# Patient Record
Sex: MALE | Race: WHITE | Employment: UNEMPLOYED | URBAN - METROPOLITAN AREA
[De-identification: names, ages, dates, MRNs, and addresses within clinical notes are randomized per-mention and may not be internally consistent; named-entity substitution may affect disease eponyms.]

---

## 2019-02-06 ENCOUNTER — APPOINTMENT (OUTPATIENT)
Dept: CT IMAGING | Age: 74
End: 2019-02-06

## 2019-02-06 ENCOUNTER — APPOINTMENT (OUTPATIENT)
Dept: GENERAL RADIOLOGY | Age: 74
End: 2019-02-06

## 2019-02-06 ENCOUNTER — HOSPITAL ENCOUNTER (EMERGENCY)
Age: 74
Discharge: ANOTHER HOSPITAL | End: 2019-02-06
Attending: EMERGENCY MEDICINE

## 2019-02-06 VITALS
WEIGHT: 174 LBS | TEMPERATURE: 99.6 F | OXYGEN SATURATION: 92 % | DIASTOLIC BLOOD PRESSURE: 71 MMHG | SYSTOLIC BLOOD PRESSURE: 119 MMHG | HEART RATE: 89 BPM | HEIGHT: 68 IN | BODY MASS INDEX: 26.37 KG/M2 | RESPIRATION RATE: 16 BRPM

## 2019-02-06 DIAGNOSIS — J98.2 PNEUMOMEDIASTINUM (HCC): ICD-10-CM

## 2019-02-06 DIAGNOSIS — K22.3 ESOPHAGEAL PERFORATION: Primary | ICD-10-CM

## 2019-02-06 DIAGNOSIS — L02.11 NECK ABSCESS: ICD-10-CM

## 2019-02-06 DIAGNOSIS — R10.9 FLANK PAIN: ICD-10-CM

## 2019-02-06 LAB
ALBUMIN SERPL-MCNC: 3.2 G/DL (ref 3.5–5.2)
ALP BLD-CCNC: 55 U/L (ref 40–129)
ALT SERPL-CCNC: 24 U/L (ref 0–40)
ANION GAP SERPL CALCULATED.3IONS-SCNC: 14 MMOL/L (ref 7–16)
APTT: 25.8 SEC (ref 24.5–35.1)
AST SERPL-CCNC: 25 U/L (ref 0–39)
BASOPHILS ABSOLUTE: 0.03 E9/L (ref 0–0.2)
BASOPHILS RELATIVE PERCENT: 0.4 % (ref 0–2)
BILIRUB SERPL-MCNC: 1 MG/DL (ref 0–1.2)
BUN BLDV-MCNC: 31 MG/DL (ref 8–23)
BURR CELLS: ABNORMAL
CALCIUM SERPL-MCNC: 9 MG/DL (ref 8.6–10.2)
CHLORIDE BLD-SCNC: 91 MMOL/L (ref 98–107)
CO2: 26 MMOL/L (ref 22–29)
CREAT SERPL-MCNC: 1.4 MG/DL (ref 0.7–1.2)
DOHLE BODIES: ABNORMAL
EOSINOPHILS ABSOLUTE: 0 E9/L (ref 0.05–0.5)
EOSINOPHILS RELATIVE PERCENT: 0 % (ref 0–6)
GFR AFRICAN AMERICAN: >60
GFR NON-AFRICAN AMERICAN: 50 ML/MIN/1.73
GLUCOSE BLD-MCNC: 144 MG/DL (ref 74–99)
HCT VFR BLD CALC: 44.2 % (ref 37–54)
HEMOGLOBIN: 14.3 G/DL (ref 12.5–16.5)
IMMATURE GRANULOCYTES #: 0.04 E9/L
IMMATURE GRANULOCYTES %: 0.5 % (ref 0–5)
INR BLD: 1.1
LACTIC ACID: 2.3 MMOL/L (ref 0.5–2.2)
LIPASE: 12 U/L (ref 13–60)
LYMPHOCYTES ABSOLUTE: 0.37 E9/L (ref 1.5–4)
LYMPHOCYTES RELATIVE PERCENT: 4.3 % (ref 20–42)
MCH RBC QN AUTO: 28.3 PG (ref 26–35)
MCHC RBC AUTO-ENTMCNC: 32.4 % (ref 32–34.5)
MCV RBC AUTO: 87.5 FL (ref 80–99.9)
MONOCYTES ABSOLUTE: 0.59 E9/L (ref 0.1–0.95)
MONOCYTES RELATIVE PERCENT: 6.9 % (ref 2–12)
NEUTROPHILS ABSOLUTE: 7.53 E9/L (ref 1.8–7.3)
NEUTROPHILS RELATIVE PERCENT: 87.9 % (ref 43–80)
PDW BLD-RTO: 13.9 FL (ref 11.5–15)
PLATELET # BLD: 102 E9/L (ref 130–450)
PMV BLD AUTO: 11.5 FL (ref 7–12)
POIKILOCYTES: ABNORMAL
POTASSIUM REFLEX MAGNESIUM: 3.9 MMOL/L (ref 3.5–5)
PROTHROMBIN TIME: 13 SEC (ref 9.3–12.4)
RBC # BLD: 5.05 E12/L (ref 3.8–5.8)
SODIUM BLD-SCNC: 131 MMOL/L (ref 132–146)
TOTAL PROTEIN: 7.3 G/DL (ref 6.4–8.3)
TOXIC GRANULATION: ABNORMAL
TROPONIN: <0.01 NG/ML (ref 0–0.03)
VACUOLATED NEUTROPHILS: ABNORMAL
WBC # BLD: 8.6 E9/L (ref 4.5–11.5)

## 2019-02-06 PROCEDURE — 92511 NASOPHARYNGOSCOPY: CPT | Performed by: OTOLARYNGOLOGY

## 2019-02-06 PROCEDURE — 85610 PROTHROMBIN TIME: CPT

## 2019-02-06 PROCEDURE — 2500000003 HC RX 250 WO HCPCS: Performed by: EMERGENCY MEDICINE

## 2019-02-06 PROCEDURE — 85025 COMPLETE CBC W/AUTO DIFF WBC: CPT

## 2019-02-06 PROCEDURE — 96367 TX/PROPH/DG ADDL SEQ IV INF: CPT

## 2019-02-06 PROCEDURE — 85730 THROMBOPLASTIN TIME PARTIAL: CPT

## 2019-02-06 PROCEDURE — 70491 CT SOFT TISSUE NECK W/DYE: CPT

## 2019-02-06 PROCEDURE — 2580000003 HC RX 258: Performed by: EMERGENCY MEDICINE

## 2019-02-06 PROCEDURE — 71045 X-RAY EXAM CHEST 1 VIEW: CPT

## 2019-02-06 PROCEDURE — 6360000002 HC RX W HCPCS: Performed by: EMERGENCY MEDICINE

## 2019-02-06 PROCEDURE — 99284 EMERGENCY DEPT VISIT MOD MDM: CPT | Performed by: OTOLARYNGOLOGY

## 2019-02-06 PROCEDURE — 80053 COMPREHEN METABOLIC PANEL: CPT

## 2019-02-06 PROCEDURE — 96375 TX/PRO/DX INJ NEW DRUG ADDON: CPT

## 2019-02-06 PROCEDURE — 74174 CTA ABD&PLVS W/CONTRAST: CPT

## 2019-02-06 PROCEDURE — 83605 ASSAY OF LACTIC ACID: CPT

## 2019-02-06 PROCEDURE — 6360000004 HC RX CONTRAST MEDICATION: Performed by: RADIOLOGY

## 2019-02-06 PROCEDURE — 96365 THER/PROPH/DIAG IV INF INIT: CPT

## 2019-02-06 PROCEDURE — 83690 ASSAY OF LIPASE: CPT

## 2019-02-06 PROCEDURE — 84484 ASSAY OF TROPONIN QUANT: CPT

## 2019-02-06 PROCEDURE — 99291 CRITICAL CARE FIRST HOUR: CPT

## 2019-02-06 PROCEDURE — 71275 CT ANGIOGRAPHY CHEST: CPT

## 2019-02-06 PROCEDURE — 2580000003 HC RX 258: Performed by: RADIOLOGY

## 2019-02-06 RX ORDER — SODIUM CHLORIDE 0.9 % (FLUSH) 0.9 %
10 SYRINGE (ML) INJECTION 2 TIMES DAILY
Status: DISCONTINUED | OUTPATIENT
Start: 2019-02-06 | End: 2019-02-06 | Stop reason: HOSPADM

## 2019-02-06 RX ORDER — DOXYCYCLINE HYCLATE 100 MG/1
100 CAPSULE ORAL 2 TIMES DAILY
COMMUNITY
Start: 2019-02-04 | End: 2019-02-14

## 2019-02-06 RX ORDER — FENTANYL CITRATE 50 UG/ML
50 INJECTION, SOLUTION INTRAMUSCULAR; INTRAVENOUS ONCE
Status: COMPLETED | OUTPATIENT
Start: 2019-02-06 | End: 2019-02-06

## 2019-02-06 RX ORDER — CLINDAMYCIN PHOSPHATE 900 MG/50ML
900 INJECTION INTRAVENOUS ONCE
Status: COMPLETED | OUTPATIENT
Start: 2019-02-06 | End: 2019-02-06

## 2019-02-06 RX ORDER — OSELTAMIVIR PHOSPHATE 75 MG/1
75 CAPSULE ORAL 2 TIMES DAILY
COMMUNITY
Start: 2019-02-04 | End: 2019-02-09

## 2019-02-06 RX ORDER — IBUPROFEN 400 MG/1
400 TABLET ORAL EVERY 6 HOURS PRN
Status: ON HOLD | COMMUNITY
End: 2019-06-22 | Stop reason: HOSPADM

## 2019-02-06 RX ORDER — 0.9 % SODIUM CHLORIDE 0.9 %
1000 INTRAVENOUS SOLUTION INTRAVENOUS ONCE
Status: COMPLETED | OUTPATIENT
Start: 2019-02-06 | End: 2019-02-06

## 2019-02-06 RX ADMIN — Medication 10 ML: at 04:37

## 2019-02-06 RX ADMIN — IOPAMIDOL 100 ML: 755 INJECTION, SOLUTION INTRAVENOUS at 04:49

## 2019-02-06 RX ADMIN — SODIUM CHLORIDE 3 G: 900 INJECTION INTRAVENOUS at 05:33

## 2019-02-06 RX ADMIN — CLINDAMYCIN IN 5 PERCENT DEXTROSE 900 MG: 18 INJECTION, SOLUTION INTRAVENOUS at 06:52

## 2019-02-06 RX ADMIN — FENTANYL CITRATE 50 MCG: 50 INJECTION, SOLUTION INTRAMUSCULAR; INTRAVENOUS at 03:51

## 2019-02-06 RX ADMIN — SODIUM CHLORIDE 1000 ML: 9 INJECTION, SOLUTION INTRAVENOUS at 03:42

## 2019-02-06 ASSESSMENT — PAIN DESCRIPTION - PAIN TYPE: TYPE: ACUTE PAIN

## 2019-02-06 ASSESSMENT — ENCOUNTER SYMPTOMS
SORE THROAT: 1
CONSTIPATION: 0
BLOOD IN STOOL: 0
NAUSEA: 0
ABDOMINAL PAIN: 1
VOICE CHANGE: 0
EYE REDNESS: 0
COLOR CHANGE: 0
SHORTNESS OF BREATH: 0
DIARRHEA: 0
ABDOMINAL DISTENTION: 0
COUGH: 0
CHEST TIGHTNESS: 0
ANAL BLEEDING: 0
VOMITING: 0
TROUBLE SWALLOWING: 0
CHOKING: 0
EYE PAIN: 0

## 2019-02-06 ASSESSMENT — PAIN SCALES - GENERAL
PAINLEVEL_OUTOF10: 4
PAINLEVEL_OUTOF10: 3
PAINLEVEL_OUTOF10: 4

## 2019-02-06 ASSESSMENT — PAIN DESCRIPTION - ONSET: ONSET: ON-GOING

## 2019-02-06 ASSESSMENT — PAIN DESCRIPTION - LOCATION: LOCATION: FLANK

## 2019-02-06 ASSESSMENT — PAIN DESCRIPTION - FREQUENCY: FREQUENCY: CONTINUOUS

## 2019-02-06 ASSESSMENT — PAIN DESCRIPTION - PROGRESSION
CLINICAL_PROGRESSION: GRADUALLY WORSENING
CLINICAL_PROGRESSION: GRADUALLY IMPROVING

## 2019-02-06 ASSESSMENT — PAIN DESCRIPTION - ORIENTATION: ORIENTATION: RIGHT

## 2019-02-06 ASSESSMENT — PAIN DESCRIPTION - DESCRIPTORS: DESCRIPTORS: SHARP;CONSTANT

## 2019-02-06 NOTE — ED PROVIDER NOTES
Please note that the withdrawal or failure to initiate urgent interventions for this patient would likely result in a life threatening deterioration or permanent disability. Accordingly this patient received 30 minutes of critical care time, excluding separately billable procedures. crit     Attending; I examined and reexamined the patient on multiple occasions and the patient was complaining of significant neck and posterior pharynx pain. He was tender and had some fullness in the right neck however there was no palpable crepitance. He had multiple areas of white exudate on the tongue and posterior oropharynx. The patient had CT scan of soft tissue neck, CT of chest and CT of abdomen. The patient was noted to have thickness of the esophagus and air in the posterior pharyngeal space probably related to perforated esophagus. ENT and cardiothoracic were consulted. I spoke to Dr. Delroy Trejo, who was on call for cardiothoracic; he stated that he does not operate on or  care of esophageal problems; and I asked if there was anyone a [place] that did ,he said there was not. He [facility]; I then called to the access Center and spoke to Dr. Phil Oliveira who was on-call for [facility] he accepted the patient for transfer to the ER. We attempted to fly the patient presents for however weather prevented this and the patient was in the process of being transported by ground. The patient although having significant CT findings on the neck and chest remained relatively stable was not in any distress did not require intubation at this time there was no stridor or drooling. Vital signs remained stable. Labs were reviewed the patient was given broad-spectrum antibiotics prior to transfer he was seen by the ENT senior resident who discussed the case with his attending.   Again the patient will be transferred by critical care team to the [facility]..     Lisa Rodriguez MD  02/07/19 [id number]

## 2019-02-06 NOTE — ED PROVIDER NOTES
The patient is a 73 y/o male with no significant PMH, who presents for flank pain and phayngitis. Patient developed a sore throat 1 week ago. He was seen by PCP and prescribed doxycycline and tamiflu for the flu. He has been taking both without relief. He continues to have pain with swallowing but getting progressively worse to the point where he has difficult time swallowing secretions or even water. Today he began developing a right lower flank pain which is sharp and severe. Nothing makes it better or worse. He denies previous similar episodes or recent traumas or injuries. No associated nausea vomiting or diarrhea, dysuria or hematuria, testicular pain or swelling. Denies history tobacco use, hypertension, aneurysms. He denies stridor, choking, shortness of breath, trismus. Denies food or pills getting stuck in throat. The history is provided by the patient. Abdominal Pain   Pain location:  R flank  Pain quality: sharp    Pain radiates to:  R flank  Pain severity:  Severe  Onset quality:  Sudden  Timing:  Constant  Progression:  Unchanged  Chronicity:  New  Context: not alcohol use, not awakening from sleep, not diet changes, not eating, not laxative use, not medication withdrawal, not previous surgeries, not suspicious food intake and not trauma    Relieved by:  Nothing  Worsened by:  Nothing  Ineffective treatments:  None tried  Associated symptoms: sore throat    Associated symptoms: no anorexia, no chest pain, no chills, no constipation, no cough, no diarrhea, no dysuria, no fatigue, no fever, no hematuria, no melena, no nausea, no shortness of breath and no vomiting    Risk factors: being elderly    Risk factors: no alcohol abuse, no aspirin use, has not had multiple surgeries, no NSAID use, not obese and no recent hospitalization        Review of Systems   Constitutional: Negative for appetite change, chills, fatigue and fever. HENT: Positive for sore throat.  Negative for congestion, drooling, No respiratory distress. He has no wheezes. He has no rales. Abdominal: Soft. Normal appearance and bowel sounds are normal. He exhibits no distension, no abdominal bruit and no mass. There is tenderness. There is no rebound and no guarding. No hernia. Musculoskeletal: Normal range of motion. He exhibits no edema. Lymphadenopathy:     He has no cervical adenopathy. Neurological: He is alert and oriented to person, place, and time. Skin: Skin is warm and dry. Capillary refill takes less than 2 seconds. No rash noted. He is not diaphoretic. No erythema. No pallor. Nursing note and vitals reviewed. Procedures    MDM    ED Course as of Feb 06 0736   Wed Feb 06, 2019   1280 Patient has very uncomfortable. We'll treat with pain medication. We'll obtain CTs of the neck chest abdomen to evaluate for soft tissue infection, aneurysm or tears of the aorta. [JL]   Noni Garcia, thoracic surgery. He does not do esophageal repairs and recommended contacting [address] [facility] [address]. [JL]   4011 Patient is resting comfortably. I updated him and his wife on the results of the CT scan showing esophageal perforation. I discussed the need to transfer the patient for surgical repair. Empiric antibiotics ordered. [JL]      ED Course User Index  [JL] Jay Villa,      Patient found to have free air in soft tissues of neck with possible abscess. Given esophagitis, concern for esophageal perforation. No inciting event noted by patient. Denies food or medications getting stuck; however, doxycycline pill esophagitis is a possibility. No etiology of abdominal pain which was patient's main CC to begin with. No signs of aneurysm or dissection. He is resting much more comfortably after Iv fentanyl. No respiratory distress. No surgeons in town that can handle esophageal rupture to patient transferred to [place]. Patient was evaluated and scoped by ENT as well. No obvious perf seen.  Patient and wife updated multiple times by myself and other providers. Abx initiated. He remains stable and is agreeable with transfer. --------------------------------------------- PAST HISTORY ---------------------------------------------  Past Medical History:  has a past medical history of DVT of lower extremity (deep venous thrombosis) (HCC) and Influenza. Past Surgical History:  has no past surgical history on file. Social History:  reports that he has never smoked. He has never used smokeless tobacco. He reports that he does not drink alcohol or use drugs. Family History: family history is not on file. The patients home medications have been reviewed. Allergies: Patient has no known allergies.     -------------------------------------------------- RESULTS -------------------------------------------------    Lab  Results for orders placed or performed during the hospital encounter of 02/06/19   CBC Auto Differential   Result Value Ref Range    WBC 8.6 4.5 - 11.5 E9/L    RBC 5.05 3.80 - 5.80 E12/L    Hemoglobin 14.3 12.5 - 16.5 g/dL    Hematocrit 44.2 37.0 - 54.0 %    MCV 87.5 80.0 - 99.9 fL    MCH 28.3 26.0 - 35.0 pg    MCHC 32.4 32.0 - 34.5 %    RDW 13.9 11.5 - 15.0 fL    Platelets 686 (L) 426 - 450 E9/L    MPV 11.5 7.0 - 12.0 fL    Neutrophils % 87.9 (H) 43.0 - 80.0 %    Immature Granulocytes % 0.5 0.0 - 5.0 %    Lymphocytes % 4.3 (L) 20.0 - 42.0 %    Monocytes % 6.9 2.0 - 12.0 %    Eosinophils % 0.0 0.0 - 6.0 %    Basophils % 0.4 0.0 - 2.0 %    Neutrophils # 7.53 (H) 1.80 - 7.30 E9/L    Immature Granulocytes # 0.04 E9/L    Lymphocytes # 0.37 (L) 1.50 - 4.00 E9/L    Monocytes # 0.59 0.10 - 0.95 E9/L    Eosinophils # 0.00 (L) 0.05 - 0.50 E9/L    Basophils # 0.03 0.00 - 0.20 E9/L    Toxic Granulation 1+     Dohle Bodies 1+     Vacuolated Neutrophils 1+     Poikilocytes 1+     [place] Cells 1+    Comprehensive Metabolic Panel w/ Reflex to MG   Result Value Ref Range    Sodium 131 (L) 132 - 146 mmol/L    Potassium reflex Magnesium 3.9 3.5 - 5.0 mmol/L    Chloride 91 (L) 98 - 107 mmol/L    CO2 26 22 - 29 mmol/L    Anion Gap 14 7 - 16 mmol/L    Glucose 144 (H) 74 - 99 mg/dL    BUN 31 (H) 8 - 23 mg/dL    CREATININE 1.4 (H) 0.7 - 1.2 mg/dL    GFR Non-African American 50 >=60 mL/min/1.73    GFR African American >60     Calcium 9.0 8.6 - 10.2 mg/dL    Total Protein 7.3 6.4 - 8.3 g/dL    Alb 3.2 (L) 3.5 - 5.2 g/dL    Total Bilirubin 1.0 0.0 - 1.2 mg/dL    Alkaline Phosphatase 55 40 - 129 U/L    ALT 24 0 - 40 U/L    AST 25 0 - 39 U/L   Lipase   Result Value Ref Range    Lipase 12 (L) 13 - 60 U/L   Troponin   Result Value Ref Range    Troponin <0.01 0.00 - 0.03 ng/mL   Protime-INR   Result Value Ref Range    Protime 13.0 (H) 9.3 - 12.4 sec    INR 1.1    APTT   Result Value Ref Range    aPTT 25.8 24.5 - 35.1 sec   Lactic Acid, Plasma   Result Value Ref Range    Lactic Acid 2.3 (H) 0.5 - 2.2 mmol/L   EKG 12 Lead   Result Value Ref Range    Ventricular Rate 116 BPM    Atrial Rate 116 BPM    P-R Interval 154 ms    QRS Duration 98 ms    Q-T Interval 302 ms    QTc Calculation (Bazett) 419 ms    P Axis 50 degrees    R Axis 8 degrees    T Axis 58 degrees       Radiology  XR CHEST PORTABLE   Final Result   Tortuous ectatic aorta   Cardiomegaly    Airspace disease compatible with atelectasis or pneumonia   at the right and the left lung base                  CT Soft Tissue Neck W Contrast   Final Result   Addendum 1 of 1      Initial report created on 2/6/2019 5:27:29 AM EST   EXAM:  CT Neck With Intravenous Contrast      CLINICAL HISTORY:  72years old, male; Pain; Neck pain;  Additional info:    Neck    pain, dysphagia      TECHNIQUE:  Axial computed tomography images of the neck with intravenous    contrast.  All CT scans at this facility use at least one of these dose    optimization techniques: automated exposure control; mA and/or kV    adjustment    per patient size (includes targeted exams where dose is matched to    clinical    indication); or iterative reconstruction. Coronal and sagittal    reformatted    images were created and reviewed. CONTRAST:  100 mL of [id number] was administered intravenously. COMPARISON:  No relevant prior studies available. FINDINGS:     Oropharynx:  Unremarkable. No significant tonsillar enlargement. No    peritonsillar abscess. Hypopharynx:  Extensive mucosal thickening in the hypopharynx, with    prominent    on the right and posteriorly. Larynx:  Unremarkable. Normal epiglottis. Trachea:  Unremarkable. Retropharyngeal space: There is free air in the retropharyngeal space    consistent with the presence of a pneumomediastinum. Submandibular/parotid glands:  Unremarkable. Glands are normal in size. Thyroid:  Unremarkable. No enlarged or calcified nodules. Bones/joints:  No acute fracture. Soft tissues:  Multiple abscess formations in the right neck from the    level    of the adenoids to hyoid bone. These are predominantly in the right    palatine    tonsil and adjacent soft tissues. The largest abscess measures 2.7 x 1.2    cm in    transverse dimension. Surrounding edema. Vasculature:  No acute findings. Lymph nodes:  Prominent anterior cervical chain lymph nodes on the right       which are likely reactive nodes. Esophagus:  Mucosal thickening in the esophagus as described on chest    CT. Lung apices:  Unremarkable as visualized. Mediastinum:  Pneumomediastinum. IMPRESSION:        1.  Mucosal thickening in the esophagus as described on chest CT. 2.  There is free air in the retropharyngeal space consistent with the    presence    of a pneumomediastinum. 3.  Multiple abscess formations in the right neck from the level of the    adenoids to hyoid bone. These are predominantly in the right palatine    tonsil    and adjacent soft tissues. The largest abscess measures 2.7 x 1.2 cm in    transverse dimension. Surrounding edema.    4.  Extensive mucosal thickening in the hypopharynx, with prominent on the       right and posteriorly. Addendum created by Dr. Jeff Golden MD on 2/6/2019 6:37:03 AM EST      CRITICAL RESULT:      THIS REPORT CONTAINS FINDINGS THAT MAY BE CRITICAL TO PATIENT CARE. The       findings were verbally communicated via telephone conference with Dr. Rea Og    at    6:34 AM EST on 2/6/2019. The findings were acknowledged and understood. This addendum has been electronically signed by Jeff Golden MD.      Final   1. Mucosal thickening in the esophagus as described on chest CT. 2.  There is free air in the retropharyngeal space consistent with the presence    of a pneumomediastinum. 3.  Multiple abscess formations in the right neck from the level of the    adenoids to hyoid bone. These are predominantly in the right palatine tonsil    and adjacent soft tissues. The largest abscess measures 2.7 x 1.2 cm in    transverse dimension. Surrounding edema. 4.  Extensive mucosal thickening in the hypopharynx, with prominent on the    right and posteriorly. This report has been electronically signed by Jeff Golden MD.      CTA PULMONARY W CONTRAST   Final Result   Addendum 1 of 1      Initial report created on 2/6/2019 5:19:09 AM EST   EXAM:  CT Angiography Chest Without And With Intravenous Contrast      CLINICAL HISTORY:  72years old, male; Pain; Chest pain; Additional info:    Neck,    chest pain - R/O dissection      TECHNIQUE:  Axial computed tomographic angiography images of the chest    without    and with intravenous contrast using pulmonary embolism protocol. All CT    scans    at this facility use at least one of these dose optimization techniques:    automated exposure control; mA and/or kV adjustment per patient size    (includes    targeted exams where dose is matched to clinical indication); or iterative       reconstruction. MIP reconstructed images were created and reviewed.       CONTRAST:  100 mL of [id number] was administered intravenously. COMPARISON:  DX XR CHEST PORTABLE 2/6/2019 4:04 AM      FINDINGS:     Pulmonary arteries:  Unremarkable. No pulmonary embolism. Aorta:  No acute findings. No thoracic aortic aneurysm. Lungs:  Unremarkable. No mass. No consolidation. Pleural space: Moderate right and small left pleural effusions. No pneumothorax. Heart:  Unremarkable. No cardiomegaly. No significant pericardial    effusion. No evidence of RV dysfunction. Mediastinum:  Mucosal thickening in the esophagus. Surrounding    inflammation. Pneumomediastinum. Bones/joints:  Flowing osteophyte formation is lower thoracic spine    compatible with DISH. No acute fracture. No dislocation. Soft tissues:  Unremarkable. Lymph nodes:  Unremarkable. No enlarged lymph nodes. IMPRESSION:        1.  Mucosal thickening in the esophagus. Surrounding inflammation. Pneumomediastinum seen adjacent to the upper esophagus. Findings are    concerning    for an esophageal perforation. Gastrografin swallow with contrast CT of    the    chest would be helpful to assess for rupture if indicated. 2.  No pulmonary embolism. 3.  Moderate right and small left pleural effusions. Addendum created by Dr. Costa Mo MD on 2/6/2019 6:36:57 AM EST      CRITICAL RESULT:      THIS REPORT CONTAINS FINDINGS THAT MAY BE CRITICAL TO PATIENT CARE. The       findings were verbally communicated via telephone conference with Dr. Oliver Yang    at    6:34 AM EST on 2/6/2019. The findings were acknowledged and understood. This addendum has been electronically signed by Costa Mo MD.      Final   1. Mucosal thickening in the esophagus. Surrounding inflammation. Pneumomediastinum seen adjacent to the upper esophagus. Findings are concerning    for an esophageal perforation.  Gastrografin swallow with contrast CT of the    chest would be helpful to assess for rupture if indicated. 2.  No pulmonary embolism. 3.  Moderate right and small left pleural effusions. This report has been electronically signed by Forrest Christine MD.      [address] [place] [address]   Final Result   1. No abdominal aortic aneurysm or dissection. Mild atherosclerotic disease. 2.  Mucosal thickening distal esophagus. 3.  Fluid in the colon consistent with diarrhea. This report has been electronically signed by Forrest Christine MD.          EKG: This EKG is signed and interpreted by me. Rate: 116  Rhythm: Sinus  Interpretation: no acute changes, non-specific EKG and Baseline artifact  Comparison: no previous EKG available      ------------------------- NURSING NOTES AND VITALS REVIEWED ---------------------------  Date / Time Roomed:  2/6/2019  2:53 AM  ED Bed Assignment:  26/26    The nursing notes within the ED encounter and vital signs as below have been reviewed. Patient Vitals for the past 24 hrs:   BP Temp Temp src Pulse Resp SpO2 Height Weight   02/06/19 0656 119/71 99.6 °F (37.6 °C) Oral 89 16 92 % - -   02/06/19 0630 (!) 110/55 - - 92 - 91 % - -   02/06/19 0530 (!) 117/55 99.7 °F (37.6 °C) Oral 94 16 92 % - -   02/06/19 0400 (!) 145/75 - - 98 - 92 % - -   02/06/19 0259 (!) 148/75 99.2 °F (37.3 °C) Oral 101 20 93 % 5' 8\" (1.727 m) 174 lb (78.9 kg)       Oxygen Saturation Interpretation: Normal      ------------------------------------------ PROGRESS NOTES ------------------------------------------  Re-evaluation(s):  Time: 9713. Patients symptoms are improving  Repeat physical examination is improved    I have spoken with the patient and wife and discussed todays results, in addition to providing specific details for the plan of care and counseling regarding the diagnosis and prognosis. Their questions are answered at this time and they are agreeable with the plan.   I have discussed the risks and benefits of transfer and they wish to proceed with the transfer. --------------------------------- ADDITIONAL PROVIDER NOTES ---------------------------------  Consultations:  Spoke with Dr. Parish Michelle surgery at [facility] (cardiothoracic surgery). Discussed case. They will provide consultation and accept the patient for transfer. Reason for transfer: Higher level of care. This patient's ED course included: a personal history and physicial examination, re-evaluation prior to disposition, multiple bedside re-evaluations, IV medications, cardiac monitoring, continuous pulse oximetry and complex medical decision making and emergency management    This patient has remained hemodynamically stable and been closely monitored during their ED course. Please note that the withdrawal or failure to initiate urgent interventions for this patient would likely result in a life threatening deterioration or permanent disability. Accordingly this patient received 60 minutes of critical care time, excluding separately billable procedures. Clinical Impression  1. Esophageal perforation    2. Flank pain    3. Neck abscess    4. Pneumomediastinum ([address]          Disposition  Patient's disposition: Transfer to [facility]. Transferred by: mobile ICU. Patient's condition is serious.               Wilian Mccabe DO  Resident  02/06/19 2438

## 2019-02-06 NOTE — CONSULTS
Otolaryngology  Consult Note    Patient's Name/Date of Birth: Myrna Gomes / 1945 (87 y.o.)    Date: February 6, 2019     Chief Complaint: Sore throat    HPI: 71 y/o M with no prior medical history presents with R flank pain and sore throat for 5 days. He was recently treated with tamiflu and doxycycline by his primary care physician. He denies prior dysphagia, dysphonia, chest pain, hemoptysis, weight change, or emesis. He has not smoked or drank in over 40 years. He denies any sick contacts. No history of HIV. He works in an [employer]. Denies any trauma or globus sensation. He is currently resting in bed in no acute distress and his vital signs are stable. He does have a muffled voice. Past Medical History:   Diagnosis Date    DVT of lower extremity (deep venous thrombosis) ([facility] [address]     left    Influenza 02/2019       History reviewed. No pertinent surgical history. Prior to Admission medications    Medication Sig Start Date End Date Taking? Authorizing Provider   oseltamivir (TAMIFLU) 75 MG capsule Take 75 mg by mouth 2 times daily 2/4/19 2/9/19 Yes Historical Provider, MD   doxycycline hyclate (VIBRAMYCIN) 100 MG capsule Take 100 mg by mouth 2 times daily 2/4/19 2/14/19 Yes Historical Provider, MD   ibuprofen (ADVIL;MOTRIN) 400 MG tablet Take 400 mg by mouth every 6 hours as needed for Pain   Yes Historical Provider, MD       No Known Allergies    History reviewed. No pertinent family history. Social History     Social History    Marital status:      Spouse name: N/A    Number of children: N/A    Years of education: N/A     Occupational History    Not on file.      Social History Main Topics    Smoking status: Never Smoker    Smokeless tobacco: Never Used    Alcohol use No    Drug use: No    Sexual activity: Not on file     Other Topics Concern    Not on file     Social History Narrative    No narrative on file       Review of Systems:   CONSTITUTIONAL:  negative for fevers, chills, fatigue and weight loss   EYES:  negative for  blurred vision, eye discharge and visual disturbance   HEENT:  negative for  hearing loss, tinnitus, ear drainage, earaches, nasal congestion, epistaxis, snoring, hoarseness positive for sore throat and voice change  RESPIRATORY:  negative for  dry cough, cough with sputum, dyspnea and wheezing   CARDIOVASCULAR:  negative for  chest pain, dyspnea   GASTROINTESTINAL:  negative for dysphagia and reflux   INTEGUMENT/BREAST:  negative for rash and skin lesion(s)   HEMATOLOGIC/LYMPHATIC:  negative for easy bruising, bleeding and lymphadenopathy   ALLERGIC/IMMUNOLOGIC:  negative for recurrent infections, urticaria and angioedema   ENDOCRINE:  negative for heat intolerance, cold intolerance and weight changes   MUSCULOSKELETAL:  negative for  myalgias and arthralgias     Physical Exam:  Vitals:    02/06/19 0400 02/06/19 0530 02/06/19 0630 02/06/19 0656   BP: (!) 145/75 (!) 117/55 (!) 110/55 119/71   Pulse: 98 94 92 89   Resp:  16  16   Temp:  99.7 °F (37.6 °C)  99.6 °F (37.6 °C)   TempSrc:  Oral  Oral   SpO2: 92% 92% 91% 92%   Weight:       Height:           CONSTITUTIONAL:  awake, alert, cooperative, no apparent distress, and appears stated age   EYES:  Lids and lashes normal, pupils equal, round and reactive to light, extra ocular muscles intact, sclera clear  EARS: external ears without lesions, no drainage  NOSE: Nares normal. Septum midline. Mucosa normal. No drainage or sinus tenderness. , no sinus tenderness  MOUTH: upper and lower dentures, diffuse white patches to posterior soft palate and anterior tonsilar fossae, no palpable fluctuance, no uvular deviation, tonsils are +1 bilaterally  THROAT: muffled voice,   NECK:  Tender lymphadenopathy to R level II  HEMATOLOGIC/LYMPHATICS:  cervical lymphadenopathy noted  LUNGS:  no increased work of breathing and good air exchange  CARDIOVASCULAR:  Normal rate   ABDOMEN: Soft, non-distended  SKIN: Warm, no rashes or edema    LABS:  CBC  Recent Labs      02/06/19 0343   WBC  8.6   HGB  14.3   HCT  44.2   PLT  102*     BMP  Recent Labs      02/06/19 0343   NA  131*   K  3.9   CL  91*   CO2  26   BUN  31*   CREATININE  1.4*   CALCIUM  9.0     Liver Function  Recent Labs      02/06/19 0343   LIPASE  12*   BILITOT  1.0   AST  25   ALT  24   ALKPHOS  55   PROT  7.3   LABALBU  3.2*     No results for input(s): LACTATE in the last 72 hours. Recent Labs      02/06/19 0343   INR  1.1       RADIOLOGY    Ct Soft Tissue Neck W Contrast    Addendum Date: 2/6/2019    Initial report created on 2/6/2019 5:27:29 AM EST EXAM:  CT Neck With Intravenous Contrast CLINICAL HISTORY:  72years old, male; Pain; Neck pain; Additional info: Neck pain, dysphagia TECHNIQUE:  Axial computed tomography images of the neck with intravenous contrast.  All CT scans at this facility use at least one of these dose optimization techniques: automated exposure control; mA and/or kV adjustment per patient size (includes targeted exams where dose is matched to clinical indication); or iterative reconstruction. Coronal and sagittal reformatted images were created and reviewed. CONTRAST:  100 mL of [id number] was administered intravenously. COMPARISON:  No relevant prior studies available. FINDINGS:   Oropharynx:  Unremarkable. No significant tonsillar enlargement. No peritonsillar abscess. Hypopharynx:  Extensive mucosal thickening in the hypopharynx, with prominent on the right and posteriorly. Larynx:  Unremarkable. Normal epiglottis. Trachea:  Unremarkable. Retropharyngeal space: There is free air in the retropharyngeal space consistent with the presence of a pneumomediastinum. Submandibular/parotid glands:  Unremarkable. Glands are normal in size. Thyroid:  Unremarkable. No enlarged or calcified nodules. Bones/joints:  No acute fracture.    Soft tissues:  Multiple abscess formations in the right neck from the level of the adenoids to hyoid bone. These are predominantly in the right palatine tonsil and adjacent soft tissues. The largest abscess measures 2.7 x 1.2 cm in transverse dimension. Surrounding edema. Vasculature:  No acute findings. Lymph nodes:  Prominent anterior cervical chain lymph nodes on the right which are likely reactive nodes. Esophagus:  Mucosal thickening in the esophagus as described on chest CT. Lung apices:  Unremarkable as visualized. Mediastinum:  Pneumomediastinum. IMPRESSION:     1.  Mucosal thickening in the esophagus as described on chest CT. 2.  There is free air in the retropharyngeal space consistent with the presence of a pneumomediastinum. 3.  Multiple abscess formations in the right neck from the level of the adenoids to hyoid bone. These are predominantly in the right palatine tonsil and adjacent soft tissues. The largest abscess measures 2.7 x 1.2 cm in transverse dimension. Surrounding edema. 4.  Extensive mucosal thickening in the hypopharynx, with prominent on the right and posteriorly. Addendum created by Dr. Lisa Simental MD on 2/6/2019 6:37:03 AM EST CRITICAL RESULT:   THIS REPORT CONTAINS FINDINGS THAT MAY BE CRITICAL TO PATIENT CARE. The findings were verbally communicated via telephone conference with Dr. Earnestine Montague at 6:34 AM EST on 2/6/2019. The findings were acknowledged and understood. This addendum has been electronically signed by Lisa Simental MD.    Result Date: 2/6/2019  Initial report created on 2/6/2019 5:27:29 AM EST EXAM:  CT Neck With Intravenous Contrast CLINICAL HISTORY:  72years old, male; Pain; Neck pain; Additional info: Neck pain, dysphagia TECHNIQUE:  Axial computed tomography images of the neck with intravenous contrast.  All CT scans at this facility use at least one of these dose optimization techniques: automated exposure control; mA and/or kV adjustment per patient size (includes targeted exams where dose is matched to clinical indication); or iterative reconstruction. Coronal and sagittal reformatted images were created and reviewed. CONTRAST:  100 mL of [id number] was administered intravenously. COMPARISON:  No relevant prior studies available. FINDINGS:   Oropharynx:  Unremarkable. No significant tonsillar enlargement. No peritonsillar abscess. Hypopharynx:  Extensive mucosal thickening in the hypopharynx, with prominent on the right and posteriorly. Larynx:  Unremarkable. Normal epiglottis. Trachea:  Unremarkable. Retropharyngeal space: There is free air in the retropharyngeal space consistent with the presence of a pneumomediastinum. Submandibular/parotid glands:  Unremarkable. Glands are normal in size. Thyroid:  Unremarkable. No enlarged or calcified nodules. Bones/joints:  No acute fracture. Soft tissues:  Multiple abscess formations in the right neck from the level of the adenoids to hyoid bone. These are predominantly in the right palatine tonsil and adjacent soft tissues. The largest abscess measures 2.7 x 1.2 cm in transverse dimension. Surrounding edema. Vasculature:  No acute findings. Lymph nodes:  Prominent anterior cervical chain lymph nodes on the right which are likely reactive nodes. Esophagus:  Mucosal thickening in the esophagus as described on chest CT. Lung apices:  Unremarkable as visualized. Mediastinum:  Pneumomediastinum. 1.  Mucosal thickening in the esophagus as described on chest CT. 2.  There is free air in the retropharyngeal space consistent with the presence of a pneumomediastinum. 3.  Multiple abscess formations in the right neck from the level of the adenoids to hyoid bone. These are predominantly in the right palatine tonsil and adjacent soft tissues. The largest abscess measures 2.7 x 1.2 cm in transverse dimension. Surrounding edema. 4.  Extensive mucosal thickening in the hypopharynx, with prominent on the right and posteriorly.  This report has been electronically signed by Jeff Golden MD.    Xr Chest Portable    Result Date: 2019  Patient MRN: [id number] : 1945 Age:  72 years Gender: Male Order Date: 2019 3:30 AM Exam: XR CHEST PORTABLE Number of Images: 1 view Indication:   chest pain chest pain Comparison: None. Findings: The heart is enlarged. The lung fields demonstrate evidence for airspace disease. The aorta is tortuous and ectatic. Tortuous ectatic aorta Cardiomegaly Airspace disease compatible with atelectasis or pneumonia at the right and the left lung base     Cta Abdomen Pelvis W Contrast    Result Date: 2019  Initial report created on 2019 5:31:29 AM EST EXAM:  CT Angiography Abdomen and Pelvis Without And With Intravenous Contrast CLINICAL HISTORY:  72years old, male; Pain; Abdominal pain; Other: Right sided; Additional info: R/O dissection TECHNIQUE:  Axial computed tomographic angiography images of the abdomen and pelvis without and with intravenous contrast.  All CT scans at this facility use at least one of these dose optimization techniques: automated exposure control; mA and/or kV adjustment per patient size (includes targeted exams where dose is matched to clinical indication); or iterative reconstruction. MIP reconstructed images were created and reviewed. Coronal reformatted images were created and reviewed. CONTRAST:  100 mL of [id number] was administered intravenously. COMPARISON:  No relevant prior studies available. FINDINGS:  VASCULATURE:   Aorta:  No acute findings. No abdominal aortic aneurysm or dissection. Mild atherosclerotic disease. Celiac trunk and mesenteric arteries:  No acute findings. No occlusion or significant stenosis. Renal arteries:  No acute findings. No occlusion or significant stenosis. Iliac arteries:  No acute findings. No occlusion or significant stenosis. Lung bases:  See the chest CT for acute findings in the lungs and lower chest.   Mediastinum:  Mucosal thickening distal esophagus. ABDOMEN:   Liver:  Unremarkable. No mass. Gallbladder and bile ducts:  Small stone within gallbladder. Gallbladder otherwise normal.       No ductal dilation. Pancreas:  Unremarkable. No ductal dilation. No mass. Spleen:  Unremarkable. No splenomegaly. Adrenals:  Unremarkable. No mass. Kidneys and ureters:  Unremarkable. No obstructing stones. No hydronephrosis. No solid mass. Stomach and bowel:  Fluid in the colon consistent with diarrhea. No obstruction. No mucosal thickening. PELVIS:   Appendix:  Normal appendix. Bladder:  Unremarkable. No stones. No mass. Reproductive:  Unremarkable as visualized. ABDOMEN and PELVIS:   Intraperitoneal space:  Unremarkable. No significant fluid collection. No free air. Bones/joints:  No acute fracture. No dislocation. Soft tissues:  Bilateral inguinal hernias containing fat. Lymph nodes:  Unremarkable. No enlarged lymph nodes. 1.  No abdominal aortic aneurysm or dissection. Mild atherosclerotic disease. 2.  Mucosal thickening distal esophagus. 3.  Fluid in the colon consistent with diarrhea. This report has been electronically signed by Jaida Moya MD.    Lilly [facility] Pulmonary W Contrast    Addendum Date: 2/6/2019    Initial report created on 2/6/2019 5:19:09 AM EST EXAM:  CT Angiography Chest Without And With Intravenous Contrast CLINICAL HISTORY:  72years old, male; Pain; Chest pain; Additional info: Neck, chest pain - R/O dissection TECHNIQUE:  Axial computed tomographic angiography images of the chest without and with intravenous contrast using pulmonary embolism protocol. All CT scans at this facility use at least one of these dose optimization techniques: automated exposure control; mA and/or kV adjustment per patient size (includes targeted exams where dose is matched to clinical indication); or iterative reconstruction. MIP reconstructed images were created and reviewed. CONTRAST:  100 mL of [id number] was administered intravenously.   COMPARISON:  DX XR CHEST dose optimization techniques: automated exposure control; mA and/or kV adjustment per patient size (includes targeted exams where dose is matched to clinical indication); or iterative reconstruction. MIP reconstructed images were created and reviewed. CONTRAST:  100 mL of [id number] was administered intravenously. COMPARISON:  DX XR CHEST PORTABLE 2/6/2019 4:04 AM FINDINGS:   Pulmonary arteries:  Unremarkable. No pulmonary embolism. Aorta:  No acute findings. No thoracic aortic aneurysm. Lungs:  Unremarkable. No mass. No consolidation. Pleural space: Moderate right and small left pleural effusions. No pneumothorax. Heart:  Unremarkable. No cardiomegaly. No significant pericardial effusion. No evidence of RV dysfunction. Mediastinum:  Mucosal thickening in the esophagus. Surrounding inflammation. Pneumomediastinum. Bones/joints:  Flowing osteophyte formation is lower thoracic spine compatible with DISH. No acute fracture. No dislocation. Soft tissues:  Unremarkable. Lymph nodes:  Unremarkable. No enlarged lymph nodes. 1.  Mucosal thickening in the esophagus. Surrounding inflammation. Pneumomediastinum seen adjacent to the upper esophagus. Findings are concerning for an esophageal perforation. Gastrografin swallow with contrast CT of the chest would be helpful to assess for rupture if indicated. 2.  No pulmonary embolism. 3.  Moderate right and small left pleural effusions. This report has been electronically signed by Shoshana Mei MD.    Endoscopy Procedure Note    Pre-operative Diagnosis: peritonsillar and retropharyngeal abscess    Post-operative Diagnosis: same    Indications: Acute airway obstruction evaluation    Anesthesia: Lidocaine 4% and Suhail-Synephrine 1/2%    Endoscopy Type:  nasal endoscopy, laryngoscopy    Procedure Details   With the patient sitting upright in the examining chair informed consent was obtained.   The bilateral side(s) of the nose were topically anesthetized with spray. After waiting an appropriate period of time for anesthesia/ vasoconstriction to become effective, the flexible fiberoptic  flexible laryngoscope was passed through the right side(s) of the nose, and the nose, nasopharynx, oropharynx, hypopharynx and larynx were examined. An identical procedure was performed on the contralateral side. Examination was performed during quiet respiration and with phonation. I was present for the entire procedure. The following findings were noted.     Findings:  Mucosa:  normal   Nasal septum:  deviated   Discharge:  none   Turbinates:  normal   Adenoid:  normal   Posterior choanae:  normal   Eustachian tubes:  normal   Mucous stranding:  absent   Lesions:  absent   Modified Clark's Maneuver not indicated       Condition:  Stable    Complications:  None  Findings: fullness to R parapharyngeal region, no masses, lesions, or ulceration, no herpetic lesion, no obvious mucosal disruption, epiglottis is sharp, bilateral vocal cords are symmetric and mobile, airway is widely patent, pyriform sinuses are without abnormalities    Assessment/Plan:  1. 71 y/o M with parapharyngeal and retropharyngeal abscess with possible esophageal rupture, pneumomediastinum    - recommend emergent transfer to tertiary care facility for possible esophageal intervention  - high does antibiotics and critical care management  - discussed emergent nature of this case with the emergency room physician and this was discussed with ENT attending physician  - transfer is being arranged  - recommend appropriate IV access and cardiopulmonary monitoring, consider elective intubation  - the patient on exam does not appear to be in any distress but I would have a low threshold for intubation    Patient seen, examined, and plan discussed with Dr. Tisha Arita    Electronically signed by Efe Mo DO on 2/6/2019 at 8:37 AM

## 2019-02-21 LAB
EKG ATRIAL RATE: 116 BPM
EKG P AXIS: 50 DEGREES
EKG P-R INTERVAL: 154 MS
EKG Q-T INTERVAL: 302 MS
EKG QRS DURATION: 98 MS
EKG QTC CALCULATION (BAZETT): 419 MS
EKG R AXIS: 8 DEGREES
EKG T AXIS: 58 DEGREES
EKG VENTRICULAR RATE: 116 BPM

## 2019-03-07 ENCOUNTER — HOSPITAL ENCOUNTER (OUTPATIENT)
Dept: CT IMAGING | Age: 74
Discharge: HOME | End: 2019-03-09

## 2019-03-07 DIAGNOSIS — J39.1: ICD-10-CM

## 2019-03-07 PROCEDURE — 2580000003 HC RX 258: Performed by: RADIOLOGY

## 2019-03-07 PROCEDURE — 70491 CT SOFT TISSUE NECK W/DYE: CPT

## 2019-03-07 PROCEDURE — 6360000004 HC RX CONTRAST MEDICATION: Performed by: RADIOLOGY

## 2019-03-07 PROCEDURE — 71260 CT THORAX DX C+: CPT

## 2019-03-07 RX ORDER — SODIUM CHLORIDE 0.9 % (FLUSH) 0.9 %
10 SYRINGE (ML) INJECTION ONCE
Status: COMPLETED | OUTPATIENT
Start: 2019-03-07 | End: 2019-03-07

## 2019-03-07 RX ADMIN — IOPAMIDOL 110 ML: 755 INJECTION, SOLUTION INTRAVENOUS at 13:11

## 2019-03-07 RX ADMIN — Medication 10 ML: at 13:12

## 2019-03-13 ENCOUNTER — HOSPITAL ENCOUNTER (OUTPATIENT)
Dept: GENERAL RADIOLOGY | Age: 74
Discharge: HOME | End: 2019-03-15

## 2019-03-13 DIAGNOSIS — J39.1: ICD-10-CM

## 2019-03-13 PROCEDURE — 2500000003 HC RX 250 WO HCPCS: Performed by: RADIOLOGY

## 2019-03-13 PROCEDURE — 92611 MOTION FLUOROSCOPY/SWALLOW: CPT | Performed by: SPEECH-LANGUAGE PATHOLOGIST

## 2019-03-13 PROCEDURE — 74230 X-RAY XM SWLNG FUNCJ C+: CPT

## 2019-03-13 RX ADMIN — BARIUM SULFATE 88 G: 960 POWDER, FOR SUSPENSION ORAL at 10:40

## 2019-03-13 RX ADMIN — BARIUM SULFATE 45 G: 0.6 CREAM ORAL at 10:39

## 2019-06-05 ENCOUNTER — HOSPITAL ENCOUNTER (INPATIENT)
Age: 74
LOS: 17 days | Discharge: HOME | DRG: 885 | End: 2019-06-22
Attending: EMERGENCY MEDICINE | Admitting: PSYCHIATRY & NEUROLOGY
Payer: MEDICARE

## 2019-06-05 DIAGNOSIS — F03.918 PSYCHOSIS IN ELDERLY WITH BEHAVIORAL DISTURBANCE: Primary | ICD-10-CM

## 2019-06-05 PROBLEM — F29 PSYCHOSIS (HCC): Status: ACTIVE | Noted: 2019-06-05

## 2019-06-05 LAB
ACETAMINOPHEN LEVEL: <5 MCG/ML (ref 10–30)
ALBUMIN SERPL-MCNC: 4 G/DL (ref 3.5–5.2)
ALP BLD-CCNC: 61 U/L (ref 40–129)
ALT SERPL-CCNC: 31 U/L (ref 0–40)
AMPHETAMINE SCREEN, URINE: NOT DETECTED
ANION GAP SERPL CALCULATED.3IONS-SCNC: 8 MMOL/L (ref 7–16)
AST SERPL-CCNC: 25 U/L (ref 0–39)
BACTERIA: ABNORMAL /HPF
BARBITURATE SCREEN URINE: NOT DETECTED
BASOPHILS ABSOLUTE: 0.02 E9/L (ref 0–0.2)
BASOPHILS RELATIVE PERCENT: 0.2 % (ref 0–2)
BENZODIAZEPINE SCREEN, URINE: NOT DETECTED
BILIRUB SERPL-MCNC: 0.4 MG/DL (ref 0–1.2)
BILIRUBIN URINE: NEGATIVE
BLOOD, URINE: ABNORMAL
BUN BLDV-MCNC: 15 MG/DL (ref 8–23)
CALCIUM SERPL-MCNC: 9.6 MG/DL (ref 8.6–10.2)
CANNABINOID SCREEN URINE: NOT DETECTED
CHLORIDE BLD-SCNC: 99 MMOL/L (ref 98–107)
CLARITY: CLEAR
CO2: 28 MMOL/L (ref 22–29)
COCAINE METABOLITE SCREEN URINE: NOT DETECTED
COLOR: YELLOW
CREAT SERPL-MCNC: 0.8 MG/DL (ref 0.7–1.2)
EKG ATRIAL RATE: 108 BPM
EKG P AXIS: 71 DEGREES
EKG P-R INTERVAL: 166 MS
EKG Q-T INTERVAL: 344 MS
EKG QRS DURATION: 92 MS
EKG QTC CALCULATION (BAZETT): 460 MS
EKG R AXIS: 35 DEGREES
EKG T AXIS: 75 DEGREES
EKG VENTRICULAR RATE: 108 BPM
EOSINOPHILS ABSOLUTE: 0.02 E9/L (ref 0.05–0.5)
EOSINOPHILS RELATIVE PERCENT: 0.2 % (ref 0–6)
ETHANOL: <10 MG/DL (ref 0–0.08)
GFR AFRICAN AMERICAN: >60
GFR NON-AFRICAN AMERICAN: >60 ML/MIN/1.73
GLUCOSE BLD-MCNC: 105 MG/DL (ref 74–99)
GLUCOSE URINE: NEGATIVE MG/DL
HCT VFR BLD CALC: 42.8 % (ref 37–54)
HEMOGLOBIN: 14.1 G/DL (ref 12.5–16.5)
IMMATURE GRANULOCYTES #: 0.05 E9/L
IMMATURE GRANULOCYTES %: 0.6 % (ref 0–5)
KETONES, URINE: NEGATIVE MG/DL
LEUKOCYTE ESTERASE, URINE: ABNORMAL
LYMPHOCYTES ABSOLUTE: 2.17 E9/L (ref 1.5–4)
LYMPHOCYTES RELATIVE PERCENT: 24.6 % (ref 20–42)
MCH RBC QN AUTO: 29.1 PG (ref 26–35)
MCHC RBC AUTO-ENTMCNC: 32.9 % (ref 32–34.5)
MCV RBC AUTO: 88.2 FL (ref 80–99.9)
METHADONE SCREEN, URINE: NOT DETECTED
MONOCYTES ABSOLUTE: 0.94 E9/L (ref 0.1–0.95)
MONOCYTES RELATIVE PERCENT: 10.7 % (ref 2–12)
NEUTROPHILS ABSOLUTE: 5.62 E9/L (ref 1.8–7.3)
NEUTROPHILS RELATIVE PERCENT: 63.7 % (ref 43–80)
NITRITE, URINE: NEGATIVE
OPIATE SCREEN URINE: NOT DETECTED
PDW BLD-RTO: 13.3 FL (ref 11.5–15)
PH UA: 5 (ref 5–9)
PHENCYCLIDINE SCREEN URINE: NOT DETECTED
PLATELET # BLD: 211 E9/L (ref 130–450)
PMV BLD AUTO: 10.7 FL (ref 7–12)
POTASSIUM SERPL-SCNC: 4.3 MMOL/L (ref 3.5–5)
PROPOXYPHENE SCREEN: NOT DETECTED
PROTEIN UA: NEGATIVE MG/DL
RBC # BLD: 4.85 E12/L (ref 3.8–5.8)
RBC UA: ABNORMAL /HPF (ref 0–2)
SALICYLATE, SERUM: <0.3 MG/DL (ref 0–30)
SODIUM BLD-SCNC: 135 MMOL/L (ref 132–146)
SPECIFIC GRAVITY UA: >=1.03 (ref 1–1.03)
T4 TOTAL: 6.9 MCG/DL (ref 4.5–11.7)
TOTAL PROTEIN: 7.3 G/DL (ref 6.4–8.3)
TRICYCLIC ANTIDEPRESSANTS SCREEN SERUM: NEGATIVE NG/ML
TSH SERPL DL<=0.05 MIU/L-ACNC: 4.02 UIU/ML (ref 0.27–4.2)
UROBILINOGEN, URINE: 0.2 E.U./DL
WBC # BLD: 8.8 E9/L (ref 4.5–11.5)
WBC UA: ABNORMAL /HPF (ref 0–5)

## 2019-06-05 PROCEDURE — 80307 DRUG TEST PRSMV CHEM ANLYZR: CPT

## 2019-06-05 PROCEDURE — 99285 EMERGENCY DEPT VISIT HI MDM: CPT

## 2019-06-05 PROCEDURE — 85025 COMPLETE CBC W/AUTO DIFF WBC: CPT

## 2019-06-05 PROCEDURE — 36415 COLL VENOUS BLD VENIPUNCTURE: CPT

## 2019-06-05 PROCEDURE — 1240000000 HC EMOTIONAL WELLNESS R&B

## 2019-06-05 PROCEDURE — 80053 COMPREHEN METABOLIC PANEL: CPT

## 2019-06-05 PROCEDURE — 93010 ELECTROCARDIOGRAM REPORT: CPT | Performed by: INTERNAL MEDICINE

## 2019-06-05 PROCEDURE — 81001 URINALYSIS AUTO W/SCOPE: CPT

## 2019-06-05 PROCEDURE — 84436 ASSAY OF TOTAL THYROXINE: CPT

## 2019-06-05 PROCEDURE — 93005 ELECTROCARDIOGRAM TRACING: CPT | Performed by: EMERGENCY MEDICINE

## 2019-06-05 PROCEDURE — G0480 DRUG TEST DEF 1-7 CLASSES: HCPCS

## 2019-06-05 PROCEDURE — 84443 ASSAY THYROID STIM HORMONE: CPT

## 2019-06-05 RX ORDER — HALOPERIDOL 5 MG/ML
2 INJECTION INTRAMUSCULAR EVERY 4 HOURS PRN
Status: DISCONTINUED | OUTPATIENT
Start: 2019-06-05 | End: 2019-06-22 | Stop reason: HOSPADM

## 2019-06-05 RX ORDER — LORAZEPAM 0.5 MG/1
0.5 TABLET ORAL EVERY 8 HOURS PRN
Status: DISCONTINUED | OUTPATIENT
Start: 2019-06-05 | End: 2019-06-22 | Stop reason: HOSPADM

## 2019-06-05 RX ORDER — ACETAMINOPHEN 325 MG/1
650 TABLET ORAL EVERY 4 HOURS PRN
Status: DISCONTINUED | OUTPATIENT
Start: 2019-06-05 | End: 2019-06-22 | Stop reason: HOSPADM

## 2019-06-05 RX ORDER — BENZTROPINE MESYLATE 1 MG/ML
2 INJECTION INTRAMUSCULAR; INTRAVENOUS 2 TIMES DAILY PRN
Status: DISCONTINUED | OUTPATIENT
Start: 2019-06-05 | End: 2019-06-22 | Stop reason: HOSPADM

## 2019-06-05 RX ORDER — MAGNESIUM HYDROXIDE/ALUMINUM HYDROXICE/SIMETHICONE 120; 1200; 1200 MG/30ML; MG/30ML; MG/30ML
30 SUSPENSION ORAL PRN
Status: DISCONTINUED | OUTPATIENT
Start: 2019-06-05 | End: 2019-06-22 | Stop reason: HOSPADM

## 2019-06-05 ASSESSMENT — SLEEP AND FATIGUE QUESTIONNAIRES
AVERAGE NUMBER OF SLEEP HOURS: 4
DO YOU HAVE DIFFICULTY SLEEPING: NO
DO YOU USE A SLEEP AID: NO

## 2019-06-05 ASSESSMENT — LIFESTYLE VARIABLES: HISTORY_ALCOHOL_USE: NO

## 2019-06-05 ASSESSMENT — PATIENT HEALTH QUESTIONNAIRE - PHQ9: SUM OF ALL RESPONSES TO PHQ QUESTIONS 1-9: 15

## 2019-06-05 NOTE — ED NOTES
pts wife on site states pt did not get his zoloft this am pt cont with anuj, pt becomes paranoid at times while lab is being obtained.       Aurelia Melendrez RN  06/05/19 1157

## 2019-06-05 NOTE — ED NOTES
Completed patient assessment. Patient unable to verbalize SI/HI. Patient is a 72year old, male presenting to ED for psychiatric evaluation. Patient thought process has poverty of content & patient unable to appropriately answer most assessment questions. Patient reports his medications are making him sick. Per patient spouse, Brandon Tenorio [phone], patient recently started on Zoloft 1 week ago at [facility]. During patient assessment patient is anxious, increased psychomotor agitation, pacing, delusional, paranoid, and rambling speech. Patient reports that earlier today it was raining acid and that he did not want to go outside due to fear that the birds would eat him. Patient is religiously preoccupied and reports that God wants him to have a hernia. Per patient spouse, patient has been increasing in bizarre behaviors & possible auditory/visual hallucinations. Patient spouse reports that patient recently experienced an infection in the throat that had to be removed. She reports patient has lost 20+ lbs in 1 month and that his hernia has increased in size. Patient has a mental health hx of psychosis in 2011, recently began treatment with [facility], and pt last psychiatric admission was in 2011. Patient reports varying sleep, recently appetite has increased since starting Zoloft, and shawn hopelessness/helplessness. SHAWN hx of suicide attempts or self injurious behaviors. Pt denies drug/alcohol use. Patient cooperative, oriented x 2, anxious mood, labile affect, patient irritable at times, disorganized thought process, rambling speech. Patient does not appear to be having A/V hallucinations at this time. SW will pursue inpatient admission for safety/stabilization.      Harvey Parker, COURTNEY, [facility]  06/05/19 Dami Nieto 411, COURTNEY, [place]  06/05/19 [id number]

## 2019-06-05 NOTE — ED NOTES
Pt very anxious unable to stay still attempted to switch pts iv to heplock however pt began moving and tried to remove iv site which was placed by ems this rn had to discontinue iv site. Pt denies si denies hi denies hallucinations states he just doesn't feel right.        Ashtyn Steele, LIBRA  06/05/19 [id number]
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
## 2019-06-06 LAB
CHOLESTEROL, TOTAL: 171 MG/DL (ref 0–199)
HBA1C MFR BLD: 6 % (ref 4–5.6)
HDLC SERPL-MCNC: 50 MG/DL
LDL CHOLESTEROL CALCULATED: 96 MG/DL (ref 0–99)
TRIGL SERPL-MCNC: 125 MG/DL (ref 0–149)
VLDLC SERPL CALC-MCNC: 25 MG/DL

## 2019-06-06 PROCEDURE — 80061 LIPID PANEL: CPT

## 2019-06-06 PROCEDURE — 1240000000 HC EMOTIONAL WELLNESS R&B

## 2019-06-06 PROCEDURE — 83036 HEMOGLOBIN GLYCOSYLATED A1C: CPT

## 2019-06-06 PROCEDURE — 99221 1ST HOSP IP/OBS SF/LOW 40: CPT | Performed by: PSYCHIATRY & NEUROLOGY

## 2019-06-06 PROCEDURE — 36415 COLL VENOUS BLD VENIPUNCTURE: CPT

## 2019-06-06 PROCEDURE — 6370000000 HC RX 637 (ALT 250 FOR IP): Performed by: PSYCHIATRY & NEUROLOGY

## 2019-06-06 RX ORDER — RISPERIDONE 0.5 MG/1
0.5 TABLET, FILM COATED ORAL 2 TIMES DAILY
Status: DISCONTINUED | OUTPATIENT
Start: 2019-06-06 | End: 2019-06-08

## 2019-06-06 RX ADMIN — RISPERIDONE 0.5 MG: 0.5 TABLET, FILM COATED ORAL at 20:48

## 2019-06-06 ASSESSMENT — PAIN SCALES - GENERAL
PAINLEVEL_OUTOF10: 0

## 2019-06-06 NOTE — GROUP NOTE
Group Therapy Note    Date: June 6    Group Start Time: 1000  Group End Time: 7031  Group Topic: Psychoeducation    [facility] 7W ACUTE [facility]        Group Therapy Note    Attendees: 10     Notes: Willing to listen to discussion on positive thinking. Only verbal participation was to apologize to peers for \"offending them\". Peers expressed no reason for apology, nor understanding what patient was referring to.       Status After Intervention:  Unchanged    Participation Level: Minimal    Participation Quality: Resistant      Speech:  hesitant      Thought Process/Content: Linear      Affective Functioning: Flat      Mood: depressed      Level of consciousness:  Preoccupied and Inattentive      Response to Learning: Progressing to goal      Endings: None Reported    Modes of Intervention: Education, Support, Socialization and Exploration      Discipline Responsible: Psychoeducational Specialist      Signature:  Itz Mckeon

## 2019-06-06 NOTE — PLAN OF CARE
Patient was alert to person,place and year. Mood was flat,unsure of some questions ask. Will continue to monitor, provide needs and safe environment by continued rounding every 15 minutes.
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
## 2019-06-06 NOTE — PLAN OF CARE
[facility]  Initial Interdisciplinary Treatment Plan NOTE    Review Date & Time: 6/6/19 1900    Patient was in treatment team    Admission Type:   Admission Type: Involuntary    Reason for admission:  Reason for Admission: Because my wife brought me here (put me) here because that way I was talk. Wife states that patient had been acting odd & not eating well. So she took him to [facility] and patient was prescribed zoloft. She states that he has been taking for past couple weeks and eating then today he became terriblely odd talking out of his head was going to take him to [facility] but he wouldn't get in carso called ambulance and was brought here.       Estimated Length of Stay Update:  3-5 days  Estimated Discharge Date Update: 5-7 days    PATIENT STRENGTHS:  Patient Strengths Strengths: Positive Support, Medication Compliance  Patient Strengths and Limitations:Limitations: Tendency to isolate self, Multiple barriers to leisure interests, Difficulty problem solving/relies on others to help solve problems  Addictive Behavior:Addictive Behavior  In the past 3 months, have you felt or has someone told you that you have a problem with:  : None  Do you have a history of Chemical Use?: No  Do you have a history of Alcohol Use?: No  Do you have a history of Street Drug Abuse?: No  Histroy of Prescripton Drug Abuse?: No  Medical Problems:  Past Medical History:   Diagnosis Date    DVT of lower extremity (deep venous thrombosis) ([facility] [address]     left    Influenza 02/2019       EDUCATION:   Learner Progress Toward Treatment Goals: Reviewed results and recommendations of this team    Method: Small group    Outcome: Demonstrated Understanding    PATIENT GOALS: \"be more social\"    PLAN/TREATMENT RECOMMENDATIONS UPDATE:day 3    GOALS UPDATE:   Time frame for Short-Term Goals: 3-5 days    Jewel Momin RN

## 2019-06-06 NOTE — PLAN OF CARE
Patient out of bed pacing back and forth in day room. Talking to his self. Ask numerous times if he was tired and why don't he go lay down. Patient states that if he goes in that room 7301(pt. Room) that he will kill someone. Explained that there is no one in there except  Will continue to monitor, provide needs and safe environment by continued rounding every 15 minutes. him. Patient still refused to go in.
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
## 2019-06-06 NOTE — GROUP NOTE
Group Therapy Note    Date: June 6    Group Start Time: 7303  Group End Time: 1700  Group Topic: Group Therapy    [facility] 7W ACUTE BH 45 Malinda Rasmussen RN        Group Therapy Note: Legal Planning     Attendees: 7/12    Sury Weiss RN

## 2019-06-06 NOTE — PROGRESS NOTES
Recreation assessment completed. Attended afternoon leisure activity. Able to reminisce and reflect with peers. Was 1 of 5 in attendance. Left early to meet with social work.

## 2019-06-07 PROBLEM — E44.0 MODERATE PROTEIN-ENERGY MALNUTRITION (HCC): Status: ACTIVE | Noted: 2019-06-07

## 2019-06-07 LAB
ALBUMIN SERPL-MCNC: 3.4 G/DL (ref 3.5–5.2)
ALP BLD-CCNC: 57 U/L (ref 40–129)
ALT SERPL-CCNC: 36 U/L (ref 0–40)
ANION GAP SERPL CALCULATED.3IONS-SCNC: 8 MMOL/L (ref 7–16)
AST SERPL-CCNC: 28 U/L (ref 0–39)
BASOPHILS ABSOLUTE: 0.01 E9/L (ref 0–0.2)
BASOPHILS RELATIVE PERCENT: 0.2 % (ref 0–2)
BILIRUB SERPL-MCNC: 0.4 MG/DL (ref 0–1.2)
BUN BLDV-MCNC: 18 MG/DL (ref 8–23)
CALCIUM SERPL-MCNC: 9.4 MG/DL (ref 8.6–10.2)
CHLORIDE BLD-SCNC: 102 MMOL/L (ref 98–107)
CO2: 31 MMOL/L (ref 22–29)
CREAT SERPL-MCNC: 0.9 MG/DL (ref 0.7–1.2)
EOSINOPHILS ABSOLUTE: 0.02 E9/L (ref 0.05–0.5)
EOSINOPHILS RELATIVE PERCENT: 0.3 % (ref 0–6)
GFR AFRICAN AMERICAN: >60
GFR NON-AFRICAN AMERICAN: >60 ML/MIN/1.73
GLUCOSE BLD-MCNC: 106 MG/DL (ref 74–99)
HCT VFR BLD CALC: 41.3 % (ref 37–54)
HEMOGLOBIN: 13.1 G/DL (ref 12.5–16.5)
IMMATURE GRANULOCYTES #: 0.02 E9/L
IMMATURE GRANULOCYTES %: 0.3 % (ref 0–5)
LYMPHOCYTES ABSOLUTE: 1.73 E9/L (ref 1.5–4)
LYMPHOCYTES RELATIVE PERCENT: 26.3 % (ref 20–42)
MCH RBC QN AUTO: 28.4 PG (ref 26–35)
MCHC RBC AUTO-ENTMCNC: 31.7 % (ref 32–34.5)
MCV RBC AUTO: 89.6 FL (ref 80–99.9)
MONOCYTES ABSOLUTE: 0.83 E9/L (ref 0.1–0.95)
MONOCYTES RELATIVE PERCENT: 12.6 % (ref 2–12)
NEUTROPHILS ABSOLUTE: 3.96 E9/L (ref 1.8–7.3)
NEUTROPHILS RELATIVE PERCENT: 60.3 % (ref 43–80)
PDW BLD-RTO: 13.4 FL (ref 11.5–15)
PLATELET # BLD: 179 E9/L (ref 130–450)
PMV BLD AUTO: 10.5 FL (ref 7–12)
POTASSIUM SERPL-SCNC: 4 MMOL/L (ref 3.5–5)
RBC # BLD: 4.61 E12/L (ref 3.8–5.8)
SODIUM BLD-SCNC: 141 MMOL/L (ref 132–146)
TOTAL PROTEIN: 6.6 G/DL (ref 6.4–8.3)
WBC # BLD: 6.6 E9/L (ref 4.5–11.5)

## 2019-06-07 PROCEDURE — 6370000000 HC RX 637 (ALT 250 FOR IP): Performed by: PSYCHIATRY & NEUROLOGY

## 2019-06-07 PROCEDURE — 85025 COMPLETE CBC W/AUTO DIFF WBC: CPT

## 2019-06-07 PROCEDURE — 80053 COMPREHEN METABOLIC PANEL: CPT

## 2019-06-07 PROCEDURE — 93005 ELECTROCARDIOGRAM TRACING: CPT | Performed by: GENERAL PRACTICE

## 2019-06-07 PROCEDURE — 36415 COLL VENOUS BLD VENIPUNCTURE: CPT

## 2019-06-07 PROCEDURE — 1240000000 HC EMOTIONAL WELLNESS R&B

## 2019-06-07 PROCEDURE — 99231 SBSQ HOSP IP/OBS SF/LOW 25: CPT | Performed by: PSYCHIATRY & NEUROLOGY

## 2019-06-07 PROCEDURE — 97165 OT EVAL LOW COMPLEX 30 MIN: CPT

## 2019-06-07 RX ADMIN — RISPERIDONE 0.5 MG: 0.5 TABLET, FILM COATED ORAL at 22:19

## 2019-06-07 RX ADMIN — SERTRALINE 50 MG: 50 TABLET, FILM COATED ORAL at 08:56

## 2019-06-07 RX ADMIN — RISPERIDONE 0.5 MG: 0.5 TABLET, FILM COATED ORAL at 08:56

## 2019-06-07 ASSESSMENT — PAIN DESCRIPTION - PROGRESSION: CLINICAL_PROGRESSION: NOT CHANGED

## 2019-06-07 ASSESSMENT — PAIN DESCRIPTION - LOCATION: LOCATION: GENERALIZED

## 2019-06-07 ASSESSMENT — PAIN DESCRIPTION - DESCRIPTORS: DESCRIPTORS: ACHING;DISCOMFORT

## 2019-06-07 ASSESSMENT — PAIN - FUNCTIONAL ASSESSMENT: PAIN_FUNCTIONAL_ASSESSMENT: 0-10

## 2019-06-07 ASSESSMENT — PAIN SCALES - GENERAL
PAINLEVEL_OUTOF10: 0
PAINLEVEL_OUTOF10: 10

## 2019-06-07 ASSESSMENT — PAIN DESCRIPTION - FREQUENCY: FREQUENCY: INTERMITTENT

## 2019-06-07 ASSESSMENT — PAIN DESCRIPTION - PAIN TYPE: TYPE: CHRONIC PAIN

## 2019-06-07 ASSESSMENT — PAIN DESCRIPTION - ORIENTATION: ORIENTATION: OTHER (COMMENT)

## 2019-06-07 ASSESSMENT — PAIN DESCRIPTION - ONSET: ONSET: ON-GOING

## 2019-06-07 NOTE — PROGRESS NOTES
2:00 - 2:40 pm    Attended and participated in afternoon leisure activity. Able to enjoy reminiscence trivia. Was 1 of 11 in attendance.

## 2019-06-07 NOTE — PROGRESS NOTES
Physical Therapy    Facility/Department: [facility] 7 ACUTE  2      NAME: Bisi Schroeder  : 1945  MRN: [id number]    Date of Service: 2019    PT order received. Chart reviewed. Per chart pt moving independently with functional mobility. Will discharge PT order at this time. Please reconsult if need arises. Thank you.      Rayne Saldivar PT, DPT [id number]

## 2019-06-07 NOTE — PROGRESS NOTES
Attended morning goals group. Identified daily goal as \" Shake their hands\". Remains guarded, yet slightly more interactive with peers.

## 2019-06-07 NOTE — GROUP NOTE
Group Therapy Note    Date: June 6    Group Start Time: 1945  Group End Time: 2030  Group Topic: Relaxation    [facility] 7[facility] ACUTE BH 2    Crow Irby RN    Patient attended relaxation activity by watching television     Signature:  Crow Irby RN

## 2019-06-07 NOTE — PLAN OF CARE
Problem: Altered Mood, Psychotic Behavior:  Goal: Able to demonstrate trust by eating, participating in treatment and following staff's direction  Description  Able to demonstrate trust by eating, participating in treatment and following staff's direction  6/6/2019 2122 by Chele Hwang RN  Outcome: Met This Shift     Problem: Altered Mood, Psychotic Behavior:  Goal: Able to verbalize reality based thinking  Description  Able to verbalize reality based thinking  Outcome: Met This Shift     Problem: Altered Mood, Psychotic Behavior:  Goal: Absence of self-harm  Description  Absence of self-harm  6/6/2019 2122 by Chele Hwang RN  Outcome: Met This Shift     Problem: Altered Mood, Psychotic Behavior:  Goal: Ability to interact with others will improve  Description  Ability to interact with others will improve  Outcome: Met This Shift     Problem: Altered Mood, Psychotic Behavior:  Goal: Compliance with prescribed medication regimen will improve  Description  Compliance with prescribed medication regimen will improve  Outcome: Met This Shift

## 2019-06-07 NOTE — PLAN OF CARE
Problem: Increased nutrient needs (NI-5.1)  Goal: Food and/or Nutrient Delivery  Description: Providing ONS BID. Individualized approach for food/nutrient provision.   Outcome: Met This Shift

## 2019-06-07 NOTE — PROGRESS NOTES
OCCUPATIONAL THERAPY INITIAL EVALUATION      Date:2019  Patient Name: Maria Fernanda Crowder  MRN: [id number]  : 1945  Room: [address] [place], [place] [address]    Evaluating OT: SERINA Nesbitt, OTR/L [id number]    AM-PAC Daily Activity Raw Score:   Recommended Adaptive Equipment: none     Diagnosis: psychosis   Surgery: n/a   Pertinent Medical History: h/o  DVT   Precautions:  Falls, elopement     Home Living: Pt  Presents as a questionable historian.  Per pt report, he lives with wife and sone in a 2 story home with 3 step(s) to enter and 0 rail(s); sleeps on couch on main floor, bath/laundry in basement (10 steps down)   Bathroom setup: walk in shower   Equipment owned: none  Prior Level of Function: supervision with bathing, Mod I with IADLs; using no AD for functional mobility   Driving: yes  Occupation: retired from working in a [employer] shop    Pain Level: 0/10, but c/o of lightheadness  Cognition: A&O: 3/4; Follows multi step directions   Memory:  fair    Sequencing:  fair    Problem solving:  good    Judgement/safety:  good      Functional Assessment:   Initial Eval Status  Date: 19     Feeding IND     Grooming IND      UB Dressing IND (pt able to doff/mari shirt)      LB Dressing IND (pt able to use crossing of leg technique to doff/mari B socks sitting EOB)     Bathing Mod I     Toileting Mod I (able to complete pants management/chris care)     Bed Mobility  Log roll: IND  Supine to sit: IND   Sit to supine: IND      Functional Transfers Sit to stand:IND   Stand to sit:IND  Commode: IND     Functional Mobility IND (no AD, to/from bathroom)     Balance Sitting:     Static:  IND    Dynamic:IND  Standing: IND     Activity Tolerance good     Visual/  Perceptual Glasses: yes                Hand dominance: R  UE ROM: RUE:  WFL  LUE:  WFL  Strength: RUE: grossly 4/5 LUE: grossly 4/5   Strength: B WFL  Fine Motor Coordination:  WFL    Hearing: WFL  Sensation:  No c/o numbness or tingling  Tone:  WFL  Edema: none noted

## 2019-06-07 NOTE — PROGRESS NOTES
Measures:  · Ht: 5' 8\" (172.7 cm)   · Current Body Wt: 136 lb (61.7 kg)  · Admission Body Wt: 136 lb (61.7 kg)(first actual)  · Usual Body Wt: 156 lb (70.8 kg)  · % Weight Change:  ,  20# (13%) weight loss x 1 month  · Ideal Body Wt: 154 lb (69.9 kg), % Ideal Body 88%  · BMI Classification: BMI 18.5 - 24.9 Normal Weight    Nutrition Interventions:   Continue current diet, Start ONS(Ensure BID)  Continued Inpatient Monitoring, Education not appropriate at this time, Coordination of Care    Nutrition Evaluation:   · Evaluation: Goals set   · Goals: Consume >75% of meals/ONS.     · Monitoring: Meal Intake, Supplement Intake, Diet Tolerance, I&O, Mental Status/Confusion, Weight, Pertinent Labs, Monitor Bowel Function      Electronically signed by Tho Etienne RD, LD on 6/7/19 at 2:09 PM    Contact Number:  Ext [phone]

## 2019-06-07 NOTE — PLAN OF CARE
Pt has no plans to harm himself or others. Pt ate all his meals but told this wife he ate nothing all day. Pt can answer orientation question appropriately but sometimes make comments that don't make sense. Pt attended group and actively participated. Pt was joking with staff and other patients on the unit.

## 2019-06-07 NOTE — H&P
[address]                  [address] [facility],  [facility]                              HISTORY AND PHYSICAL    PATIENT NAME: Carmela Hi                 :        1945  MED REC NO:   [id number]                            ROOM:       7301  ACCOUNT NO:   [de-identified]                           ADMIT DATE: 2019  PROVIDER:     Tracy Scheuermann, MD      REASON FOR HOSPITALIZATION:  Increased delusion, paranoia and psychotic  symptoms. HISTORY OF PRESENT ILLNESS:  The patient seen and chart reviewed. A 26-year-old male with history of mental illness who was brought into  the ER by the EMS after wife called them because the patient was acting  \"goofy. \"  The patient got cleared medically into the ER and then sent to  the psychiatric unit for further care and stabilization. The patient was pleasant and cooperative during the time of the  interview. He said that his wife called the EMS because he was \"acting  goofy. \"  When I asked him to elaborate the patient mentioned that he  hear dogs barking around and also see \"birds flying all over. \"  As per  chart review, the patient reported that it was raining acid earlier  today and he did not want to go out due to fear that the birds would eat  him. The patient is religiously preoccupied and reports that God wants  him to have a hernia. Per the patient's spouse, the patient started  having increasingly bizarre behavior and auditory and visual  hallucination. He also told me that God is mad at him because he did  not do the things that he did before. He also reports being depressed  and down. The patient denied any symptoms of anuj or hypomania. ALLERGIES:  No known drug allergies. PAST MEDICAL HISTORY:  DVT. PAST PSYCHIATRIC HISTORY:  The patient reported two to three prior  psychiatric hospitalizations, three to six prior suicide attempts. No  suicide in the family.   The patient reported having guns at home. SUBSTANCE ABUSE HISTORY:  The patient denied any drugs or alcohol. SOCIAL HISTORY:  He was born and raised in [facility] and 9th  grade education. He is  for the last 36 years. He has two boys. He is on social security disability. He lives with his wife. He denied  any history of physical or sexual abuse. MENTAL STATUS EXAMINATION:  The patient was pleasant and cooperative. He was alert and oriented to month, place and year. He described his  mood as [place]. \"  Affect was flat, constricted. Thought process goal  directed with the flight of ideas or loosening of association. He  reports auditory and visual hallucinations. He is delusional and  paranoid. He denied suicidal ideation, intent or plan. He also denied  homicidal ideation, intent or plan. Insight and judgment was impaired. ASSESSMENT:    1.  Major depressive disorder, recurrent, severe with psychotic feature,  currently psychotic and depressed. 2.  Rule out schizoaffective disorder. PLAN:    1. I will start him on Zoloft 50 mg in the morning. 2.  I will start him on Risperdal 0.5 mg twice a day. 3.  Need to get collateral information. 4.  Continue one-to-one therapy, psychoeducation, and coping skill. 5.  Encourage activity and groups.         Eva Mcguire MD    D: 06/06/2019 17:48:00       T: 06/06/2019 17:56:05     [email or web address]  Job#: [id number]     Doc#: [id number]    CC:

## 2019-06-08 PROBLEM — F20.0 SCHIZOPHRENIA, PARANOID TYPE (HCC): Status: ACTIVE | Noted: 2019-06-05

## 2019-06-08 LAB
EKG ATRIAL RATE: 68 BPM
EKG P AXIS: 72 DEGREES
EKG P-R INTERVAL: 178 MS
EKG Q-T INTERVAL: 444 MS
EKG QRS DURATION: 94 MS
EKG QTC CALCULATION (BAZETT): 472 MS
EKG R AXIS: 23 DEGREES
EKG T AXIS: 79 DEGREES
EKG VENTRICULAR RATE: 68 BPM

## 2019-06-08 PROCEDURE — 1240000000 HC EMOTIONAL WELLNESS R&B

## 2019-06-08 PROCEDURE — 99232 SBSQ HOSP IP/OBS MODERATE 35: CPT | Performed by: NURSE PRACTITIONER

## 2019-06-08 PROCEDURE — 6370000000 HC RX 637 (ALT 250 FOR IP): Performed by: NURSE PRACTITIONER

## 2019-06-08 PROCEDURE — 93010 ELECTROCARDIOGRAM REPORT: CPT | Performed by: INTERNAL MEDICINE

## 2019-06-08 PROCEDURE — 6370000000 HC RX 637 (ALT 250 FOR IP): Performed by: PSYCHIATRY & NEUROLOGY

## 2019-06-08 RX ORDER — RISPERIDONE 1 MG/1
1 TABLET, FILM COATED ORAL 2 TIMES DAILY
Status: DISCONTINUED | OUTPATIENT
Start: 2019-06-08 | End: 2019-06-10

## 2019-06-08 RX ADMIN — RISPERIDONE 1 MG: 1 TABLET, FILM COATED ORAL at 10:24

## 2019-06-08 RX ADMIN — RISPERIDONE 1 MG: 1 TABLET, FILM COATED ORAL at 21:24

## 2019-06-08 RX ADMIN — SERTRALINE 50 MG: 50 TABLET, FILM COATED ORAL at 10:24

## 2019-06-08 ASSESSMENT — PAIN SCALES - GENERAL
PAINLEVEL_OUTOF10: 0
PAINLEVEL_OUTOF10: 0

## 2019-06-08 NOTE — PROGRESS NOTES
DATE OF SERVICE:     6/8/2019    Ramirez Patton seen today for the purpose of continuation of care. Nursing, social work reports, laboratory studies and vital signs are reviewed. Patient chief complaint today is:             [] Depression      [] Anxiety        [x] Psychosis         [] Suicidal/Homicidal                         [x] Delusions           [] Aggression          Subjective:     Patient slept well last night. Remains paranoid and delusional.  Pleasant and cooperative, took his meds, no S/E. Sleep:  [] Good [x] Fair  [] Poor  Appetite:  [] Good [x] Fair  [] Poor    Depression:  [] Mild [x] Moderate [] Severe                [] Constant [] Sporadic     Anxiety: [] Mild [] Moderate [] Severe    [] Constant [] Sporadic     Delusions: [] Mild [] Moderate [x] Severe     [x] Constant [] Sporadic     [x] Paranoid [] Somatic [] Grandiose     Hallucinations: [] Mild [x] Moderate [] Severe     [] Constant [] Sporadic    [] Auditory  [] Visual [] Tactile       Suicidal: [] Constant [] Sporadic  Homicidal: [] Constant [] Sporadic    Unscheduled Medications     [] Patient Receiving Emergency Medications \" Chemical Restraint\"   [] Requesting PRN medications for anxiety    Psychiatric Review of systems  Delusions:  [] Denies [x] Endorses   Withdrawals:  [x] Denies [] Endorses    Hallucinations: [] Denies [x] Endorses    Extra Pyramidal Symptoms: [x] Denies [] Endorses      Medical Review of Systems:     All other than marked systmes have been reviewed and are all negative.     Constitutional Symptoms: []  fever []  Chills  Skin Symptoms: [] rash []  Pruritus   Eye Symptoms: [] Vision unchanged []  recent vision problems[] blurred vision   Respiratory Symptoms:[] shortness of breath [] cough  Cardiovascular Symptoms:  [] chest pain   [] palpitations   Gastrointestinal Symptoms: []  abdominal pain []  nausea []  vomiting []  diarrhea  Genitourinary Symptoms: []  dysuria  []  hematuria   Musculoskeletal Symptoms: []  back pain []  muscle pain []  joint pain  Neurologic Symptoms: []  headache []  dizziness  Hematolymphoid Symptoms: [] Adenopathy [] Bruises   [] Schimosis       Mental Status Examination:    Cognition:      [x] Alert  [x] Awake  [x] Oriented  [x] Person  [x] Place [] Time      [] drowsy  [] tired  [] lethargic  [] distractable  [] Other     Attention/Concentration:   [x] Attentive  [] Distracted        Memory Recent and Remote: [] Intact   [] Impaired [] Partially Impaired     Language: [] Able to recognize and name objects          [] Unable to recognize and name Objects    Fund of Knowledge:  [] Poor [x]  Fair  [] Good    Speech: [] Normal  [x] Soft  [x] Slow  [] Fast [] Pressured            [] Loud [] Dysarthria  [] Incoherent    Appearance: [] Well Groomed  [x] Casual Dressed  [] Unkept  [] Disheveled          [] Normal weight[x] Thin  [] Overweight  [] Obese           Attitude: [x] Positive  [] Hostile  [] Demanding  [] Guarded  [] Defensive         [x] Cooperative  []  Uncooperative      Behavior:  [] Normal Gait  [] Walks with Assistance  [] Yudith Chair    [] Walks with Francisca Minium  [] In Hospital Bed  [] Sitting in Chair    Muscle-Skeletal:  [] Normal Muscle Tone [] Muscle Atrophy       [] Abnormal Muscle Movement     Eye Contact:  [] Good eye contact  [x] Intermittent Eye Contact  [] Poor Eye Contact     Mood: [] Depressed  [] Anxious  [] Irritated  [x] Euthymic   [] Angry [] Restless    Affect:  [x] Congruent  [] Incongruent  [] Labile  [] Constricted  [] Flat  [] Bizarre     Thought Process and Association:  [] Logical [x] Illogical       [] Linear and Goal Directed  [x] Tangential  [] Circumstantial     Thought Content:  [] Denies [x] Endorses [] Suicidal [] Homicidal  [x] Delusional      [x] Paranoid  [] Somatic  [] Grandiose    Perception: [x]  None  [] Auditory   [] Visual  [] tactile   [] olfactory  [] Illusions         Insight: [] Intact  [] Fair  [x] Limited    Judgement:  [] Intact  [] Fair  [x]

## 2019-06-08 NOTE — GROUP NOTE
Group Therapy Note    Date: June 8    Group Start Time: 8849  Group End Time: 1630  Group Topic: Healthy Living/Wellness    [address] [facility] [address] [place] Loida Gilliam, RN    Pt attended and participated in self care group.         Signature:  Rangel Doshi RN

## 2019-06-08 NOTE — PROGRESS NOTES
PT attended this am's 30 min cognitive skills group where subject was overcoming obstacle and brief looks at 5 biographies. Martinez Manifold able/willing  to stay for only approx 1/2 of the group. Indicated incre depression / voiced thoughts re no will / no reason to live. Removed himself to his room ( has reportedly done this prev as a coping measure). Re cognitive material from the group, stated he had only a 9th grade educ. However, he said he was able to read the mary childers book cover printout made for today's group members and that he was able to get the major idea re liseth's lifelong struggle w depression.

## 2019-06-08 NOTE — PROGRESS NOTES
DATE OF SERVICE:     6/7/2019    Ana Luisa Patton seen today for the purpose of continuation of care. Nursing, social work reports, laboratory studies and vital signs are reviewed. Patient chief complaint today is:             [] Depression      [] Anxiety        [x] Psychosis         [] Suicidal/Homicidal                         [x] Delusions           [] Aggression          Subjective:     Patient was on his bed, pleasant and cooperative, took his meds, no S/E, reports good sleep and appetite. He is still delusional and paranoid. Sleep:  [] Good [x] Fair  [] Poor  Appetite:  [] Good [x] Fair  [] Poor    Depression:  [] Mild [x] Moderate [] Severe                [] Constant [] Sporadic     Anxiety: [] Mild [] Moderate [] Severe    [] Constant [] Sporadic     Delusions: [] Mild [] Moderate [x] Severe     [x] Constant [] Sporadic     [x] Paranoid [] Somatic [] Grandiose     Hallucinations: [] Mild [x] Moderate [] Severe     [] Constant [] Sporadic    [] Auditory  [] Visual [] Tactile       Suicidal: [] Constant [] Sporadic  Homicidal: [] Constant [] Sporadic    Unscheduled Medications     [] Patient Receiving Emergency Medications \" Chemical Restraint\"   [] Requesting PRN medications for anxiety    Psychiatric Review of systems  Delusions:  [] Denies [x] Endorses   Withdrawals:  [x] Denies [] Endorses    Hallucinations: [] Denies [x] Endorses    Extra Pyramidal Symptoms: [x] Denies [] Endorses      Medical Review of Systems:     All other than marked systmes have been reviewed and are all negative.     Constitutional Symptoms: []  fever []  Chills  Skin Symptoms: [] rash []  Pruritus   Eye Symptoms: [] Vision unchanged []  recent vision problems[] blurred vision   Respiratory Symptoms:[] shortness of breath [] cough  Cardiovascular Symptoms:  [] chest pain   [] palpitations   Gastrointestinal Symptoms: []  abdominal pain []  nausea []  vomiting []  diarrhea  Genitourinary Symptoms: []  dysuria  [] Judgement:  [] Intact  [] Fair  [x] Limited      Assessment/Plan:        Patient Active Problem List   Diagnosis Code    Esophageal perforation K22.3    Psychosis ([facility] [address] F29    Moderate protein-energy malnutrition ([facility] [address] E44.0         Plan:    []  Patient is refusing medications  [] Improving as expected   [x] Not improving as expected   [] Worsening    []  At Baseline      Cont meds  Encourage activity and groups      Reason for more than one antipsychotic:  [] N/A  [] 3 failed monotherapy(drugs tried):  [] Cross over to a new antipsychotic  [] Taper to monotherapy from polypharmacy  [] Augmentation of Clozapine therapy due to treatment resistance to single therapy      Signed:   Haley Cosme  6/7/2019  8:41 PM

## 2019-06-08 NOTE — PLAN OF CARE
[address] [facility]  Day 3 Interdisciplinary Treatment Plan NOTE    Review Date & Time: 6/8/19 0830    Patient was in treatment team    Admission Type:   Admission Type: Involuntary    Reason for admission:  Reason for Admission: Because my wife brought me here (put me) here because that way I was talk. Wife states that patient had been acting odd & not eating well. So she took him to [facility] and patient was prescribed zoloft. She states that he has been taking for past couple weeks and eating then today he became terriblely odd talking out of his head was going to take him to [facility] but he wouldn't get in carso called ambulance and was brought here. Estimated Length of Stay Update:   5-7 days  Estimated Discharge Date Update: 6/10/19    PATIENT STRENGTHS:  Patient Strengths Strengths: Positive Support, Medication Compliance  Patient Strengths and Limitations:Limitations: Tendency to isolate self, Multiple barriers to leisure interests, Difficulty problem solving/relies on others to help solve problems  Addictive Behavior:Addictive Behavior  In the past 3 months, have you felt or has someone told you that you have a problem with:  : None  Do you have a history of Chemical Use?: No  Do you have a history of Alcohol Use?: No  Do you have a history of Street Drug Abuse?: No  Histroy of Prescripton Drug Abuse?: No  Medical Problems:  Past Medical History:   Diagnosis Date    DVT of lower extremity (deep venous thrombosis) ([facility] [address]     left    Influenza 02/2019       Risk:  Fall RiskTotal: 76  Stephon Scale Stephon Scale Score: 22  BVC Total: 0  Change in scores no.  Changes to plan of Care  no    Status EXAM:   Status and Exam  Normal: No  Facial Expression: Worried  Affect: Appropriate  Level of Consciousness: Alert  Mood:Normal: No  Mood: Anxious, Suspicious  Motor Activity:Normal: No  Motor Activity: Decreased  Interview Behavior: Evasive  Preception: [place] to Person, [place] to Time, [place] to

## 2019-06-08 NOTE — PROGRESS NOTES
Patient denies SI, HI or hallucinations. Patient in his room this evening. Will continue to observe, and support.

## 2019-06-08 NOTE — GROUP NOTE
Group Therapy Note    Date: June 8    Group Start Time: 1630  Group End Time: 1700  Group Topic: Group Therapy    [facility]  2    Ingrid Moon RN        Group Therapy Note    Attendees: Patient attended and interacted in nursing education group             Signature:  Ingrid Moon RN

## 2019-06-08 NOTE — GROUP NOTE
Patient attended goals group and stated daily goal as to change my attitude. Group Therapy Note    Date: June 8    Group Start Time: 1000  Group End Time: 6741  Group Topic: Psychoeducation    [facility]  2    NIKUNJ Fraire    Number of participants:10  Type of group: Psychoeducation  Mode of intervention: Education, Support, Socialization, Exploration, Clarifying and Problem-solving  Topic: Coping Skills  Objective: To id positive coping skills to use on a daily basis to improve wellness. Group Therapy Note        Notes: Attended group and was an active participant in discussion    Status After Intervention:  Improved    Participation Level:  Active Listener and Interactive    Participation Quality: Attentive      Speech:  normal      Thought Process/Content: Logical      Affective Functioning: Flat      Mood: anxious and depressed      Level of consciousness:  Alert      Response to Learning: Progressing to goal      Endings: None Reported    Modes of Intervention: Education      Discipline Responsible: Psychoeducational Specialist      Signature:  NIKUNJ Reinoso

## 2019-06-08 NOTE — PLAN OF CARE
Patient was pleasant and cooperative. Denies SI,HI, and hallucinations. Patient responses were very vague, mostly answering as  \"I don't know\" or \" yeah probably\". When patient was asked where he was going to go when he left the hospital. Patient stated \"I don't know, prison maybe\". This nurse asked him if he had committed a crime and patient stated \"I don't know probably\". Patient attended morning groups but has since been isolative to room. Patient appears paranoid and suspicious.  Will continue to observe and support  Problem: Altered Mood, Psychotic Behavior:  Goal: Absence of self-harm  Description  Absence of self-harm  Outcome: Met This Shift     Problem: Altered Mood, Psychotic Behavior:  Goal: Able to verbalize reality based thinking  Description  Able to verbalize reality based thinking  Outcome: Not Met This Shift

## 2019-06-09 PROCEDURE — 6370000000 HC RX 637 (ALT 250 FOR IP): Performed by: PSYCHIATRY & NEUROLOGY

## 2019-06-09 PROCEDURE — 6370000000 HC RX 637 (ALT 250 FOR IP): Performed by: NURSE PRACTITIONER

## 2019-06-09 PROCEDURE — 1240000000 HC EMOTIONAL WELLNESS R&B

## 2019-06-09 PROCEDURE — 99231 SBSQ HOSP IP/OBS SF/LOW 25: CPT | Performed by: NURSE PRACTITIONER

## 2019-06-09 RX ORDER — TRAZODONE HYDROCHLORIDE 50 MG/1
50 TABLET ORAL NIGHTLY
Status: DISCONTINUED | OUTPATIENT
Start: 2019-06-09 | End: 2019-06-11

## 2019-06-09 RX ORDER — ESCITALOPRAM OXALATE 10 MG/1
10 TABLET ORAL DAILY
Status: DISCONTINUED | OUTPATIENT
Start: 2019-06-10 | End: 2019-06-09

## 2019-06-09 RX ORDER — DOCUSATE SODIUM 100 MG/1
100 CAPSULE, LIQUID FILLED ORAL DAILY
Status: DISCONTINUED | OUTPATIENT
Start: 2019-06-09 | End: 2019-06-22 | Stop reason: HOSPADM

## 2019-06-09 RX ORDER — ESCITALOPRAM OXALATE 10 MG/1
5 TABLET ORAL DAILY
Status: DISCONTINUED | OUTPATIENT
Start: 2019-06-10 | End: 2019-06-10

## 2019-06-09 RX ADMIN — RISPERIDONE 1 MG: 1 TABLET, FILM COATED ORAL at 09:07

## 2019-06-09 RX ADMIN — DOCUSATE SODIUM 100 MG: 100 CAPSULE, LIQUID FILLED ORAL at 18:49

## 2019-06-09 RX ADMIN — SERTRALINE 50 MG: 50 TABLET, FILM COATED ORAL at 09:07

## 2019-06-09 RX ADMIN — TRAZODONE HYDROCHLORIDE 50 MG: 50 TABLET ORAL at 21:13

## 2019-06-09 RX ADMIN — RISPERIDONE 1 MG: 1 TABLET, FILM COATED ORAL at 21:13

## 2019-06-09 ASSESSMENT — PAIN SCALES - GENERAL
PAINLEVEL_OUTOF10: 0

## 2019-06-09 NOTE — PLAN OF CARE
Problem: Altered Mood, Psychotic Behavior:  Goal: Able to verbalize reality based thinking  Description  Able to verbalize reality based thinking  Outcome: Met This Shift     Problem: Altered Mood, Psychotic Behavior:  Goal: Absence of self-harm  Description  Absence of self-harm  Outcome: Met This Shift     Problem: Pain:  Goal: Control of acute pain  Description  Control of acute pain  Outcome: Met This Shift

## 2019-06-09 NOTE — PROGRESS NOTES
DATE OF SERVICE:     6/9/2019    Brennan Patton seen today for the purpose of continuation of care. Nursing, social work reports, laboratory studies and vital signs are reviewed. Patient chief complaint today is:             [] Depression      [] Anxiety        [x] Psychosis         [] Suicidal/Homicidal                         [x] Delusions           [] Aggression          Subjective:     Patient states that he is not sleeping well. Out on unit, social with peers. Remains paranoid and delusional, but improving. Pleasant and cooperative. Meds compliant. Wife feels Zoloft made him worse and wants that change. Sleep:  [] Good [] Fair  [x] Poor  Appetite:  [] Good [x] Fair  [] Poor    Depression:  [] Mild [x] Moderate [] Severe                [] Constant [] Sporadic     Anxiety: [] Mild [] Moderate [] Severe    [] Constant [] Sporadic     Delusions: [x] Mild [] Moderate [] Severe     [] Constant [] Sporadic     [x] Paranoid [] Somatic [] Grandiose     Hallucinations: [] Mild [] Moderate [] Severe     [] Constant [] Sporadic    [] Auditory  [] Visual [] Tactile       Suicidal: [] Constant [] Sporadic  Homicidal: [] Constant [] Sporadic    Unscheduled Medications     [] Patient Receiving Emergency Medications \" Chemical Restraint\"   [] Requesting PRN medications for anxiety    Psychiatric Review of systems  Delusions:  [] Denies [x] Endorses   Withdrawals:  [x] Denies [] Endorses    Hallucinations: [x] Denies [] Endorses    Extra Pyramidal Symptoms: [x] Denies [] Endorses      Medical Review of Systems:     All other than marked systmes have been reviewed and are all negative.     Constitutional Symptoms: []  fever []  Chills  Skin Symptoms: [] rash []  Pruritus   Eye Symptoms: [] Vision unchanged []  recent vision problems[] blurred vision   Respiratory Symptoms:[] shortness of breath [] cough  Cardiovascular Symptoms:  [] chest pain   [] palpitations   Gastrointestinal Symptoms: []  abdominal pain [] nausea []  vomiting []  diarrhea  Genitourinary Symptoms: []  dysuria  []  hematuria   Musculoskeletal Symptoms: []  back pain []  muscle pain []  joint pain  Neurologic Symptoms: []  headache []  dizziness  Hematolymphoid Symptoms: [] Adenopathy [] Bruises   [] Schimosis       Mental Status Examination:    Cognition:      [x] Alert  [x] Awake  [x] Oriented  [x] Person  [x] Place [] Time      [] drowsy  [] tired  [] lethargic  [] distractable  [] Other     Attention/Concentration:   [x] Attentive  [] Distracted        Memory Recent and Remote: [] Intact   [] Impaired [] Partially Impaired     Language: [] Able to recognize and name objects          [] Unable to recognize and name Objects    Fund of Knowledge:  [] Poor [x]  Fair  [] Good    Speech: [] Normal  [x] Soft  [x] Slow  [] Fast [] Pressured            [] Loud [] Dysarthria  [] Incoherent    Appearance: [] Well Groomed  [x] Casual Dressed  [] Unkept  [] Disheveled          [] Normal weight[x] Thin  [] Overweight  [] Obese           Attitude: [x] Positive  [] Hostile  [] Demanding  [] Guarded  [] Defensive         [x] Cooperative  []  Uncooperative      Behavior:  [] Normal Gait  [] Walks with Assistance  [] Yudith Chair    [] Walks with [place]  [] In Hospital Bed  [] Sitting in Chair    Muscle-Skeletal:  [] Normal Muscle Tone [] Muscle Atrophy       [] Abnormal Muscle Movement     Eye Contact:  [] Good eye contact  [x] Intermittent Eye Contact  [] Poor Eye Contact     Mood: [] Depressed  [] Anxious  [] Irritated  [x] Euthymic   [] Angry [] Restless    Affect:  [x] Congruent  [] Incongruent  [] Labile  [] Constricted  [] Flat  [] Bizarre     Thought Process and Association:  [] Logical [x] Illogical       [] Linear and Goal Directed  [x] Tangential  [] Circumstantial     Thought Content:  [] Denies [x] Endorses [] Suicidal [] Homicidal  [x] Delusional      [x] Paranoid  [] Somatic  [] Grandiose    Perception: [x]  None  [] Auditory   [] Visual  [] tactile   [] olfactory  [] Illusions         Insight: [] Intact  [] Fair  [x] Limited    Judgement:  [] Intact  [] Fair  [x] Limited      Assessment/Plan:        Patient Active Problem List   Diagnosis Code    Esophageal perforation K22.3    Schizophrenia, paranoid type ([facility] [address] F20.0    Moderate protein-energy malnutrition ([facility] [address] E44.0         Plan:    []  Patient is refusing medications  [] Improving as expected   [x] Not improving as expected   [] Worsening    []  At Baseline      D/C Zoloft  Start Lexapro 5 mg PO QAM  Encourage activity and groups      Reason for more than one antipsychotic:  [] N/A  [] 3 failed monotherapy(drugs tried):  [] Cross over to a new antipsychotic  [] Taper to monotherapy from polypharmacy  [] Augmentation of Clozapine therapy due to treatment resistance to single therapy      Signed:  Laxmi Fernández  6/9/2019  10:57 AM

## 2019-06-09 NOTE — GROUP NOTE
Patient attended goals group and stated daily goal as to stay positive. Group Therapy Note    Date: June 9    Group Start Time: 0945  Group End Time: 1194  Group Topic: Psychoeducation    [facility]  2    NIKUNJ Fraire    Number of participants: 11  Type of group: Psychoeducation  Mode of intervention: Education, Support, Socialization, Exploration, Clarifying and Problem-solving  Topic: Coping Skills and Goal setting  Objective: To id positive coping skills to use on a daily basis. Group Therapy Note         Notes: Attended group and was able to participate in discussion. Status After Intervention:  Improved    Participation Level:  Active Listener and Interactive    Participation Quality: Attentive and Sharing      Speech:  hesitant      Thought Process/Content: Logical      Affective Functioning: Flat      Mood: anxious and depressed      Level of consciousness:  Alert      Response to Learning: Progressing to goal      Endings: None Reported    Modes of Intervention: Education      Discipline Responsible: Psychoeducational Specialist      Signature:  NIKUNJ Walden

## 2019-06-09 NOTE — PROGRESS NOTES
Pt asked to sign Voluntary Admission and stated \"If I sign this paper, will I be able to go back out there and see my wife. \" Staff assured him that he indeed would be able to and he replied \"I doubt it. \" Pt was brought in some pants and said \"I guess I have to put these on. \" Staff stated Jose Antonio Bates will probably fit you better than the ones you have on. \" Pt stated \"I doubt it. \" Pt remains in a negative space when being spoken to, hopeless. We will continue to provide support and comfort for the patient. Pts wife is concerned about the \"hernia\" in the patient's abdomen. She stated that it has grown significantly in size from when it was first discovered. Informed her that we would put in another call to Dr. Nery White for medical management.

## 2019-06-09 NOTE — PROGRESS NOTES
Pt told wife he has not had a bowel movement since admission. Upon speaking with the patient, pt seems evasive when asked about this bowel movement; pt states he has not had one and refuses to take MOM. Pt was offered a stool softener, such as Colace. Pt stated \"I will try it but I probably won't get any meals after I take it. \" pt was reassured that staff would not withhold his meals from him. This nurse put in request for patient to begin colace.

## 2019-06-09 NOTE — GROUP NOTE
Group Therapy Note    Date: June 9    Group Start Time: 7799  Group End Time: 1251  Group Topic: Relaxation    [facility] 2    Cherri Saul RN    Pt participated in relaxation and leisure group.         Signature:  Cherri Saul RN

## 2019-06-09 NOTE — PROGRESS NOTES
Pt denies SI HI and hallucinations. Pt remains guilty about not being a good  or father. Pt is flat, worried. Pt is social with others on the unit. Pt is medication compliant. Pt is attending all groups and eating his meals. No aggression. Pt is noted to play games with others on the unit. We will continue to provide support and comfort for the patient.

## 2019-06-10 PROCEDURE — 1240000000 HC EMOTIONAL WELLNESS R&B

## 2019-06-10 PROCEDURE — 6370000000 HC RX 637 (ALT 250 FOR IP): Performed by: PSYCHIATRY & NEUROLOGY

## 2019-06-10 PROCEDURE — 6370000000 HC RX 637 (ALT 250 FOR IP): Performed by: NURSE PRACTITIONER

## 2019-06-10 PROCEDURE — 99231 SBSQ HOSP IP/OBS SF/LOW 25: CPT | Performed by: NURSE PRACTITIONER

## 2019-06-10 RX ORDER — DULOXETIN HYDROCHLORIDE 20 MG/1
20 CAPSULE, DELAYED RELEASE ORAL DAILY
Status: DISCONTINUED | OUTPATIENT
Start: 2019-06-11 | End: 2019-06-11

## 2019-06-10 RX ORDER — PALIPERIDONE 3 MG/1
3 TABLET, EXTENDED RELEASE ORAL DAILY
Status: DISCONTINUED | OUTPATIENT
Start: 2019-06-11 | End: 2019-06-11

## 2019-06-10 RX ADMIN — ESCITALOPRAM OXALATE 5 MG: 10 TABLET, FILM COATED ORAL at 09:56

## 2019-06-10 RX ADMIN — RISPERIDONE 1 MG: 1 TABLET, FILM COATED ORAL at 20:26

## 2019-06-10 RX ADMIN — TRAZODONE HYDROCHLORIDE 50 MG: 50 TABLET ORAL at 20:26

## 2019-06-10 RX ADMIN — RISPERIDONE 1 MG: 1 TABLET, FILM COATED ORAL at 09:56

## 2019-06-10 RX ADMIN — DOCUSATE SODIUM 100 MG: 100 CAPSULE, LIQUID FILLED ORAL at 09:56

## 2019-06-10 ASSESSMENT — PAIN SCALES - GENERAL: PAINLEVEL_OUTOF10: 0

## 2019-06-10 NOTE — CARE COORDINATION
spoke with pt wife who still talking weird  Saying odd things, Wife report she had to call 911 as he was hype up , he believed the room was going to fill up with water, Pt report she beliefs people are saying he smells , Wife report this is bazaar behavoir, Wife took him to family doctor 05/07/18 she let the doctor know he was pacing and acting odd, She called [facility] who prescribed Zoloft 50 mg  Wife took him  To [facility]  (throat infection ) on 05/30/19 he was loosing weight, on may 31 he starting eating however on 05/05/19 she called 911. Wife reported hs stopped the Zoloftt. (which made him talk weird) wife report she was  hospitalized in 2011 and he was prescribed cymbalta 30 and 60 and Depakote ER  at night and Wandy Miroslava  Was taking in the am , Wife felt these combination helped  him and he was able to return to work Pt  Worked  in [employer], has not worker since January.   june 6 had normal afternoon and night ,morning of 06/07 he went down hill wife report just had a recent med change      Wife reports there are no guns in the home, however she does not want pt release , as this is not his baseline

## 2019-06-10 NOTE — PROGRESS NOTES
DATE OF SERVICE:     6/10/2019    Ana Luisa Patton seen today for the purpose of continuation of care. Nursing, social work reports, laboratory studies and vital signs are reviewed. Patient chief complaint today is:             [] Depression      [] Anxiety        [x] Psychosis         [] Suicidal/Homicidal                         [x] Delusions           [] Aggression          Subjective:     Patient remains paranoid. Pleasant and cooperative. Meds compliant and going to groups. Sleep:  [] Good [] Fair  [x] Poor  Appetite:  [] Good [x] Fair  [] Poor    Depression:  [] Mild [x] Moderate [] Severe                [] Constant [] Sporadic     Anxiety: [] Mild [] Moderate [] Severe    [] Constant [] Sporadic     Delusions: [] Mild [x] Moderate [] Severe     [x] Constant [] Sporadic     [x] Paranoid [] Somatic [] Grandiose     Hallucinations: [] Mild [] Moderate [] Severe     [] Constant [] Sporadic    [] Auditory  [] Visual [] Tactile       Suicidal: [] Constant [] Sporadic  Homicidal: [] Constant [] Sporadic    Unscheduled Medications     [] Patient Receiving Emergency Medications \" Chemical Restraint\"   [] Requesting PRN medications for anxiety    Psychiatric Review of systems  Delusions:  [] Denies [x] Endorses   Withdrawals:  [x] Denies [] Endorses    Hallucinations: [x] Denies [] Endorses    Extra Pyramidal Symptoms: [x] Denies [] Endorses      Medical Review of Systems:     All other than marked systmes have been reviewed and are all negative.     Constitutional Symptoms: []  fever []  Chills  Skin Symptoms: [] rash []  Pruritus   Eye Symptoms: [] Vision unchanged []  recent vision problems[] blurred vision   Respiratory Symptoms:[] shortness of breath [] cough  Cardiovascular Symptoms:  [] chest pain   [] palpitations   Gastrointestinal Symptoms: []  abdominal pain []  nausea []  vomiting []  diarrhea  Genitourinary Symptoms: []  dysuria  []  hematuria   Musculoskeletal Symptoms: []  back pain [] muscle pain []  joint pain  Neurologic Symptoms: []  headache []  dizziness  Hematolymphoid Symptoms: [] Adenopathy [] Bruises   [] Schimosis       Mental Status Examination:    Cognition:      [x] Alert  [x] Awake  [x] Oriented  [x] Person  [x] Place [] Time      [] drowsy  [] tired  [] lethargic  [] distractable  [] Other     Attention/Concentration:   [x] Attentive  [] Distracted        Memory Recent and Remote: [] Intact   [] Impaired [] Partially Impaired     Language: [] Able to recognize and name objects          [] Unable to recognize and name Objects    Fund of Knowledge:  [] Poor [x]  Fair  [] Good    Speech: [] Normal  [x] Soft  [x] Slow  [] Fast [] Pressured            [] Loud [] Dysarthria  [] Incoherent    Appearance: [] Well Groomed  [x] Casual Dressed  [] Unkept  [] Disheveled          [] Normal weight[x] Thin  [] Overweight  [] Obese           Attitude: [x] Positive  [] Hostile  [] Demanding  [] Guarded  [] Defensive         [x] Cooperative  []  Uncooperative      Behavior:  [] Normal Gait  [] Walks with Assistance  [] Yudith Chair    [] Walks with Bert Lorenz  [] In Hospital Bed  [] Sitting in Chair    Muscle-Skeletal:  [] Normal Muscle Tone [] Muscle Atrophy       [] Abnormal Muscle Movement     Eye Contact:  [] Good eye contact  [x] Intermittent Eye Contact  [] Poor Eye Contact     Mood: [] Depressed  [] Anxious  [] Irritated  [x] Euthymic   [] Angry [] Restless    Affect:  [x] Congruent  [] Incongruent  [] Labile  [] Constricted  [] Flat  [] Bizarre     Thought Process and Association:  [] Logical [x] Illogical       [] Linear and Goal Directed  [x] Tangential  [] Circumstantial     Thought Content:  [] Denies [x] Endorses [] Suicidal [] Homicidal  [x] Delusional      [x] Paranoid  [] Somatic  [] Grandiose    Perception: [x]  None  [] Auditory   [] Visual  [] tactile   [] olfactory  [] Illusions         Insight: [] Intact  [] Fair  [x] Limited    Judgement:  [] Intact  [] Fair  [x] Limited      Assessment/Plan:        Patient Active Problem List   Diagnosis Code    Esophageal perforation K22.3    Schizophrenia, paranoid type ([facility] [address] F20.0    Moderate protein-energy malnutrition ([facility] [address] E44.0         Plan:    []  Patient is refusing medications  [x] Improving as expected   [] Not improving as expected   [] Worsening    []  At Baseline      Continue current treatment  Encourage activity and groups  Pos d/c soon      Reason for more than one antipsychotic:  [] N/A  [] 3 failed monotherapy(drugs tried):  [] Cross over to a new antipsychotic  [] Taper to monotherapy from polypharmacy  [] Augmentation of Clozapine therapy due to treatment resistance to single therapy      Signed:  Rosalia Graham  6/10/2019  1:25 PM

## 2019-06-10 NOTE — PLAN OF CARE
Patient is resting in bed with eyes closed. No signs of distress or discomfort. No unit issues. Safety needs met. Will continue to monitor closely.         Problem: Altered Mood, Psychotic Behavior:  Goal: Absence of self-harm  Description  Absence of self-harm  Outcome: Met This Shift       Electronically signed by Bryce Mccallum RN on 6/10/2019 at 5:25 AM

## 2019-06-11 PROCEDURE — 1240000000 HC EMOTIONAL WELLNESS R&B

## 2019-06-11 PROCEDURE — 6370000000 HC RX 637 (ALT 250 FOR IP): Performed by: NURSE PRACTITIONER

## 2019-06-11 PROCEDURE — 6370000000 HC RX 637 (ALT 250 FOR IP): Performed by: PSYCHIATRY & NEUROLOGY

## 2019-06-11 PROCEDURE — 99232 SBSQ HOSP IP/OBS MODERATE 35: CPT | Performed by: NURSE PRACTITIONER

## 2019-06-11 RX ORDER — PALIPERIDONE 6 MG/1
6 TABLET, EXTENDED RELEASE ORAL DAILY
Status: DISCONTINUED | OUTPATIENT
Start: 2019-06-12 | End: 2019-06-19

## 2019-06-11 RX ORDER — TRAZODONE HYDROCHLORIDE 50 MG/1
100 TABLET ORAL NIGHTLY
Status: DISCONTINUED | OUTPATIENT
Start: 2019-06-11 | End: 2019-06-16

## 2019-06-11 RX ORDER — DULOXETIN HYDROCHLORIDE 30 MG/1
30 CAPSULE, DELAYED RELEASE ORAL DAILY
Status: DISCONTINUED | OUTPATIENT
Start: 2019-06-12 | End: 2019-06-17

## 2019-06-11 RX ADMIN — DULOXETINE HYDROCHLORIDE 20 MG: 20 CAPSULE, DELAYED RELEASE ORAL at 08:11

## 2019-06-11 RX ADMIN — PALIPERIDONE 3 MG: 3 TABLET, EXTENDED RELEASE ORAL at 08:11

## 2019-06-11 RX ADMIN — TRAZODONE HYDROCHLORIDE 100 MG: 50 TABLET ORAL at 20:33

## 2019-06-11 RX ADMIN — DOCUSATE SODIUM 100 MG: 100 CAPSULE, LIQUID FILLED ORAL at 08:11

## 2019-06-11 ASSESSMENT — PAIN SCALES - GENERAL
PAINLEVEL_OUTOF10: 0
PAINLEVEL_OUTOF10: 0

## 2019-06-11 NOTE — PROGRESS NOTES
Patient up to nurses station refusing to go into his room. Patient states \"I can't go into that room. [email or web address] doesn't want me in there, and the people on dayshift told me I'm contaminated and I wasn't allowed to go back in my room. \"  Told patient that no one said anything to me about him being contaminated and he's allowed to go into his room to sleep. Patient states \"I need a bathroom but I'm not allowed in there. \"  Walked with patient down to his room. Patient refused to go to the restroom but stated \"I'll try to sleep but [facility] I don't want [employer] coming after me. \"  Other patient in the room began coughing and patient stated \"see he's coughing because I smell. \"  Told patient he doesn't smell and even offered patient to get a shower if he thought he smelled that way he would feel more comfortable in his room. Patient declined and stated \"I'm contaminated and I'm the reason this place is caving in. How do I even know this is my name up there on that board? I don't even know who I am.\"  Patient requested to have the door cracked open \"in case I need to run out of here. \"

## 2019-06-11 NOTE — GROUP NOTE
Patient attended goals group and stated daily goal as to work on my feelings more. Group Therapy Note    Date: June 11    Group Start Time: 1015  Group End Time: 7380  Group Topic: Psychoeducation    L[facility] ACUTE BH 2    NIKUNJ Fraire    Number of participants:10  Type of group: Psychoeducation  Mode of intervention: Education, Support, Socialization, Exploration, Clarifying, Problem-solving and Activity  Topic:Change  Objective: To id how to make positive changes in ones life for improved wellness. Group Therapy Note             Notes: Attended group and was able to id ideas about change. Status After Intervention:  Improved    Participation Level:  Active Listener    Participation Quality: Attentive      Speech:  hesitant      Thought Process/Content: Perseverating      Affective Functioning: Flat      Mood: anxious and depressed      Level of consciousness:  Alert      Response to Learning: Progressing to goal      Endings: None Reported    Modes of Intervention: Education      Discipline Responsible: Psychoeducational Specialist      Signature:  NIKUNJ Brower

## 2019-06-11 NOTE — GROUP NOTE
Group Therapy Note    Date: June 11    Group Start Time: 1600  Group End Time: 7324  Group Topic: Relaxation    [facility], [facility]    Pt attended and participated in group.       Signature:  Braydon Castro RN

## 2019-06-11 NOTE — PROGRESS NOTES
Group Therapy Note    Date: 6/11/2019  Start Time: 11:00   End Time:  11:45  Number of Participants: 6    Type of Group: Psychotherapy    Wellness Binder Information  Module Name:    Session Number:      Patient's Goal:  Gain insight into interpersonal relationship by interacting with the group. Notes:  Pt will be able to explore feeling lack of support. Pt was given positive feedback and support. Status After Intervention:  Improved    Participation Level:  Active Listener    Participation Quality: Appropriate, Attentive and Sharing      Speech:  NORMAL      Thought Process/Content: Logical      Affective Functioning: Congruent      Mood: anxious and depressed      Level of consciousness:  Alert, Oriented x4 and Attentive      Response to Learning: Able to verbalize/acknowledge new learning      Endings: None Reported    Modes of Intervention: Support, Socialization, Exploration, Clarifying and Problem-solving      Discipline Responsible: /Counselor      Signature:  ARLENE Mathew

## 2019-06-11 NOTE — PLAN OF CARE
Pt denies HI, and AVH. Pt is paranoid, rambling about something happening to a pt and someone dying. Pt endorses SI, then states \"but that's not the point\". Pt is bizarre and religiously preoccupied. Pt's wife expressed concern for some of the statements he was making to her, but she would not describe. Pt attends groups. Pt is compliant with medications, but took convincing for him to take.      Problem: Altered Mood, Psychotic Behavior:  Goal: Able to demonstrate trust by eating, participating in treatment and following staff's direction  Description  Able to demonstrate trust by eating, participating in treatment and following staff's direction  Outcome: Ongoing     Problem: Altered Mood, Psychotic Behavior:  Goal: Able to verbalize reality based thinking  Description  Able to verbalize reality based thinking  Outcome: Not Met This Shift

## 2019-06-11 NOTE — PROGRESS NOTES
diarrhea  Genitourinary Symptoms: []  dysuria  []  hematuria   Musculoskeletal Symptoms: []  back pain []  muscle pain []  joint pain  Neurologic Symptoms: []  headache []  dizziness  Hematolymphoid Symptoms: [] Adenopathy [] Bruises   [] Schimosis       Mental Status Examination:    Cognition:      [x] Alert  [x] Awake  [x] Oriented  [x] Person  [x] Place [] Time      [] drowsy  [] tired  [] lethargic  [] distractable  [] Other     Attention/Concentration:   [x] Attentive  [] Distracted        Memory Recent and Remote: [] Intact   [] Impaired [] Partially Impaired     Language: [] Able to recognize and name objects          [] Unable to recognize and name Objects    Fund of Knowledge:  [] Poor [x]  Fair  [] Good    Speech: [] Normal  [x] Soft  [x] Slow  [] Fast [] Pressured            [] Loud [] Dysarthria  [] Incoherent    Appearance: [] Well Groomed  [x] Casual Dressed  [] Unkept  [] Disheveled          [] Normal weight[x] Thin  [] Overweight  [] Obese           Attitude: [x] Positive  [] Hostile  [] Demanding  [] Guarded  [] Defensive         [x] Cooperative  []  Uncooperative      Behavior:  [x] Normal Gait  [] Walks with Assistance  [] Yudith Chair    [] Walks with Antonella Rater  [] In Hospital Bed  [x] Sitting in Chair    Muscle-Skeletal:  [] Normal Muscle Tone [x] Muscle Atrophy       [] Abnormal Muscle Movement     Eye Contact:  [] Good eye contact  [x] Intermittent Eye Contact  [] Poor Eye Contact     Mood: [] Depressed  [] Anxious  [] Irritated  [x] Euthymic   [] Angry [] Restless    Affect:  [x] Congruent  [] Incongruent  [] Labile  [] Constricted  [] Flat  [] Bizarre     Thought Process and Association:  [] Logical [x] Illogical       [] Linear and Goal Directed  [x] Tangential  [] Circumstantial     Thought Content:  [] Denies [x] Endorses [] Suicidal [] Homicidal  [x] Delusional      [x] Paranoid  [] Somatic  [] Grandiose    Perception: [x]  None  [] Auditory   [] Visual  [] tactile   [] olfactory  []

## 2019-06-11 NOTE — GROUP NOTE
Group Therapy Note    Date: Evangelina 10    Group Start Time: 1945  Group End Time: 2045  Group Topic: Relaxation    [facility] BH 2    Derrell Parsons RN        Group Therapy Note    Attendees: Attended and participated in relaxation by watching television           Signature:  Derrell Parsons RN

## 2019-06-11 NOTE — PLAN OF CARE
Patient affect sad and irritable. Denies SI, HI, and hallucinations. Patient displays poor short term memory. He complained of not being able to find his lower dentures. They were found in his unit locker. Accepting of meds, meals, and group therapy. Visited with wife at lunch time. Little verbalization noted. No unit problems.

## 2019-06-12 PROCEDURE — 6370000000 HC RX 637 (ALT 250 FOR IP): Performed by: NURSE PRACTITIONER

## 2019-06-12 PROCEDURE — 1240000000 HC EMOTIONAL WELLNESS R&B

## 2019-06-12 PROCEDURE — 6370000000 HC RX 637 (ALT 250 FOR IP): Performed by: PSYCHIATRY & NEUROLOGY

## 2019-06-12 PROCEDURE — 99231 SBSQ HOSP IP/OBS SF/LOW 25: CPT | Performed by: NURSE PRACTITIONER

## 2019-06-12 RX ADMIN — TRAZODONE HYDROCHLORIDE 100 MG: 50 TABLET ORAL at 20:25

## 2019-06-12 RX ADMIN — DOCUSATE SODIUM 100 MG: 100 CAPSULE, LIQUID FILLED ORAL at 09:44

## 2019-06-12 RX ADMIN — DULOXETINE HYDROCHLORIDE 30 MG: 30 CAPSULE, DELAYED RELEASE ORAL at 09:44

## 2019-06-12 RX ADMIN — PALIPERIDONE 6 MG: 6 TABLET, EXTENDED RELEASE ORAL at 09:44

## 2019-06-12 ASSESSMENT — PAIN SCALES - GENERAL
PAINLEVEL_OUTOF10: 0
PAINLEVEL_OUTOF10: 0

## 2019-06-12 NOTE — PLAN OF CARE
[facility] Interdisciplinary Treatment Plan Note     Review Date & Time: 6/12/19 1800    Patient was in treatment team.    Admission Type:   Admission Type: Involuntary    Reason for admission:  Reason for Admission: Because my wife brought me here (put me) here because that way I was talk. Wife states that patient had been acting odd & not eating well. So she took him to [facility] and patient was prescribed zoloft. She states that he has been taking for past couple weeks and eating then today he became terriblely odd talking out of his head was going to take him to [facility] but he wouldn't get in carso called ambulance and was brought here.     Estimated Length of Stay Update:  7-10   Estimated Discharge Date Update: 6/12/19    PATIENT STRENGTHS:  Patient Strengths:Strengths: Positive Support, Medication Compliance  Patient Strengths and Limitations:Limitations: Tendency to isolate self, Multiple barriers to leisure interests, Difficulty problem solving/relies on others to help solve problems  Addictive Behavior:Addictive Behavior  In the past 3 months, have you felt or has someone told you that you have a problem with:  : None  Do you have a history of Chemical Use?: No  Do you have a history of Alcohol Use?: No  Do you have a history of Street Drug Abuse?: No  Histroy of Prescripton Drug Abuse?: No  Medical Problems:   Past Medical History:   Diagnosis Date    DVT of lower extremity (deep venous thrombosis) ([facility] [address]     left    Influenza 02/2019       Risk:  Fall RiskTotal: 84  Stephon Scale Stephon Scale Score: 22  BVC Total: 1  Change in scoresNo. Changes to plan of Care No    Status EXAM:   Status and Exam  Normal: No  Facial Expression: Sad, Flat  Affect: Congruent  Level of Consciousness: Alert  Mood:Normal: No  Mood: Depressed, Anxious, Sad, Guilty  Motor Activity:Normal: No  Motor Activity: Increased  Interview Behavior: Evasive, Cooperative  Preception: [place] to Person, [place] to Place  Attention:Normal: No  Attention: Unable to Concentrate  Thought Processes: Blocking  Thought Content:Normal: No  Thought Content: Poverty of Content, Paranoia  Hallucinations: None  Delusions: Yes  Delusions: Persecution, Obsessions  Memory:Normal: No  Memory: Poor Recent  Insight and Judgment: No  Insight and Judgment: Poor Judgment, Poor Insight  Present Suicidal Ideation: No  Present Homicidal Ideation: No    Daily Assessment Last Entry:   Daily Sleep (WDL): Within Defined Limits         Patient Currently in Pain: Denies  Daily Nutrition (WDL): Within Defined Limits    Patient Monitoring:  Frequency of Checks: 4 times per hour, close    Psychiatric Symptoms:   Depression Symptoms  Depression Symptoms: Feelings of hopelessess, Feelings of worthlessness, Impaired concentration  Anxiety Symptoms  Anxiety Symptoms: Generalized, Obsessions, Unexplained fears  Aleta Symptoms  Aleta Symptoms: No problems reported or observed. Psychosis Symptoms  Delusion Type: Paranoid    Suicide Risk CSSR-S:  1) Within the past month, have you wished you were dead or wished you could go to sleep and not wake up? : Yes  2) Have you actually had any thoughts of killing yourself? : Yes  3) Have you been thinking about how you might kill yourself? : Yes  5) Have you started to work out or worked out the details of how to kill yourself? Do you intend to carry out this plan? : No  6) Have you ever done anything, started to do anything, or prepared to do anything to end your life?: No  Change in Result No Change in Plan of care No    EDUCATION:   Learner Progress Toward Treatment Goals: Reviewed results and recommendations of this team and Reviewed goals and plan of care    Method: Small group    Outcome: Verbalized understanding and Needs reinforcement    PATIENT GOALS: No goal    PLAN/TREATMENT RECOMMENDATIONS UPDATE: Encourage group participation and continue to provide support.     GOALS UPDATE:  Time frame for Short-Term Goals: 1-3 days      Bre Orr RN

## 2019-06-12 NOTE — PROGRESS NOTES
Pt continues to be confused and paranoid. Pt believes that he will live here forever. Believes that we are keeping things from him. Pt remains hopeless, bizarre at times. Pt is cooperative. Pt is medication compliant. Pt does not attend groups but will stand or sit in the dining area during groups and listen for a few minutes. Pt will ask where his room is located and then tell staff that he can't go in there because his bed is soiled. Staff checked room and there was no evidence of soilage or urine on his bed. Pt states someone is constantly in there and he doesn't understand why. Pt does have a roommate. Pt was explained that housekeeping was in there to clean up the room. Pt believes that he won't be fed while he is here even after being reminded that staff would not withhold food from him. Pt's wife inquired about why he doesn't seem better. Stated maybe \"it's better that he is not on any medications. \" Staff explained to her that he came in here because he needed to be on his medications and that during his time here we have to allow medications to work even if that means changing them to see which one will work best. Pt's wife was educated that when starting a new medication it may take some time for the full effect of the medication to occur. Pt's wife was understanding. We will continue to  Provide support and comfort for the patient.

## 2019-06-12 NOTE — PLAN OF CARE
Patient was sitting at table by his self. States when ask if thoughts of harm to self or others he answered yes. When ask if plan, he verbalizes that he sees people, but did not go any further in detail. Patient observed confused, not comprehending conversation and what was being asked. Will continue to monitor, provide needs and safe environment by continued rounding every 15 minutes.

## 2019-06-12 NOTE — GROUP NOTE
Declined to attend goals group observed to be laying in bed. Attempted to have patient identify goal for the day but patient stated: 'No\"                                                                       Group Therapy Note    Date: June 12    Group Start Time: 1015  Group End Time: 1045  Group Topic: Psychoeducation    SEY[facility] 7W ACUTE  2    Ny Headley, CTRS        Group Therapy Note    Number of participants: 6  Type of group: Psychoeducation  Mode of intervention: Education, Support, Socialization, Exploration, Clarifying, Problem-solving and Activity  Topic: Positive Attitude Ball  Objective: Patient will identify ways to maintain a positive attitude in recovery. Notes:  Patient came to group and was an active listener. Declined to participate in positive ball activity but was receptive to hearing peers share. Status After Intervention:  Unchanged    Participation Level:  Active Listener and Minimal    Participation Quality: Appropriate and Attentive      Speech:  normal      Thought Process/Content: Logical      Affective Functioning: Congruent      Mood: irritable      Level of consciousness:  Preoccupied      Response to Learning: Progressing to goal      Endings: None Reported    Modes of Intervention: Education, Support, Socialization, Exploration, Clarifying, Problem-solving and Activity

## 2019-06-12 NOTE — PLAN OF CARE
Problem: Altered Mood, Psychotic Behavior:  Goal: Absence of self-harm  Description  Absence of self-harm  Outcome: Met This Shift     Problem: Altered Mood, Psychotic Behavior:  Goal: Compliance with prescribed medication regimen will improve  Description  Compliance with prescribed medication regimen will improve  Outcome: Met This Shift     Problem: Pain:  Goal: Control of chronic pain  Description  Control of chronic pain  Outcome: Met This Shift     Problem: Altered Mood, Psychotic Behavior:  Goal: Able to verbalize reality based thinking  Description  Able to verbalize reality based thinking  Outcome: Not Met This Shift

## 2019-06-12 NOTE — PLAN OF CARE
Patient resting quiet to self at this time, respirations are even and unlabored, no signs or symptoms of distress or discomfort. No PRN medications given thus far this shift. Staff will continue to conduct environmental rounds to ensure the safety of everyone on the unit. Staff will provide support and interventions as requested or required. Will continue to monitor, provide needs and safe environment by continued rounding every 15 minutes.

## 2019-06-12 NOTE — PROGRESS NOTES
Musculoskeletal Symptoms: []  back pain []  muscle pain []  joint pain  Neurologic Symptoms: []  headache []  dizziness  Hematolymphoid Symptoms: [] Adenopathy [] Bruises   [] Schimosis       Mental Status Examination:    Cognition:      [x] Alert  [x] Awake  [x] Oriented  [x] Person  [x] Place [] Time      [] drowsy  [] tired  [] lethargic  [] distractable  [] Other     Attention/Concentration:   [x] Attentive  [] Distracted        Memory Recent and Remote: [] Intact   [] Impaired [] Partially Impaired     Language: [] Able to recognize and name objects          [] Unable to recognize and name Objects    Fund of Knowledge:  [] Poor [x]  Fair  [] Good    Speech: [] Normal  [x] Soft  [x] Slow  [] Fast [] Pressured            [] Loud [] Dysarthria  [] Incoherent    Appearance: [] Well Groomed  [x] Casual Dressed  [] Unkept  [] Disheveled          [] Normal weight[x] Thin  [] Overweight  [] Obese           Attitude: [x] Positive  [] Hostile  [] Demanding  [] Guarded  [] Defensive         [x] Cooperative  []  Uncooperative      Behavior:  [x] Normal Gait  [] Walks with Assistance  [] Yudith Chair    [] Walks with Savanna Kunal  [] In Hospital Bed  [x] Sitting in Chair    Muscle-Skeletal:  [] Normal Muscle Tone [x] Muscle Atrophy       [] Abnormal Muscle Movement     Eye Contact:  [] Good eye contact  [x] Intermittent Eye Contact  [] Poor Eye Contact     Mood: [] Depressed  [] Anxious  [] Irritated  [x] Euthymic   [] Angry [] Restless    Affect:  [x] Congruent  [] Incongruent  [] Labile  [] Constricted  [] Flat  [] Bizarre     Thought Process and Association:  [] Logical [x] Illogical       [] Linear and Goal Directed  [x] Tangential  [] Circumstantial     Thought Content:  [x] Denies [] Endorses [] Suicidal [] Homicidal  [x] Delusional      [] Paranoid  [] Somatic  [] Grandiose    Perception: [x]  None  [] Auditory   [] Visual  [] tactile   [] olfactory  [] Illusions         Insight: [] Intact  [] Fair  [x] Limited Judgement:  [] Intact  [] Fair  [x] Limited      Assessment/Plan:        Patient Active Problem List   Diagnosis Code    Esophageal perforation K22.3    Schizophrenia, paranoid type ([facility] [address] F20.0    Moderate protein-energy malnutrition ([facility] [address] E44.0         Plan:    []  Patient is refusing medications  [] Improving as expected   [x] Not improving as expected   [] Worsening    []  At Baseline      Continue current treatment  Encourage activity and groups      Reason for more than one antipsychotic:  [x] N/A  [] 3 failed monotherapy(drugs tried):  [] Cross over to a new antipsychotic  [] Taper to monotherapy from polypharmacy  [] Augmentation of Clozapine therapy due to treatment resistance to single therapy      Signed:  Ambrosio Mcginnis  6/12/2019  1:49 PM

## 2019-06-13 PROCEDURE — 6370000000 HC RX 637 (ALT 250 FOR IP): Performed by: PSYCHIATRY & NEUROLOGY

## 2019-06-13 PROCEDURE — 1240000000 HC EMOTIONAL WELLNESS R&B

## 2019-06-13 PROCEDURE — 99231 SBSQ HOSP IP/OBS SF/LOW 25: CPT | Performed by: NURSE PRACTITIONER

## 2019-06-13 PROCEDURE — 6370000000 HC RX 637 (ALT 250 FOR IP): Performed by: NURSE PRACTITIONER

## 2019-06-13 RX ADMIN — DULOXETINE HYDROCHLORIDE 30 MG: 30 CAPSULE, DELAYED RELEASE ORAL at 09:00

## 2019-06-13 RX ADMIN — TRAZODONE HYDROCHLORIDE 100 MG: 50 TABLET ORAL at 21:03

## 2019-06-13 RX ADMIN — DOCUSATE SODIUM 100 MG: 100 CAPSULE, LIQUID FILLED ORAL at 09:00

## 2019-06-13 RX ADMIN — PALIPERIDONE 6 MG: 6 TABLET, EXTENDED RELEASE ORAL at 09:00

## 2019-06-13 ASSESSMENT — PAIN SCALES - GENERAL: PAINLEVEL_OUTOF10: 0

## 2019-06-13 NOTE — PROGRESS NOTES
Per peer reports, patient lost his balance and went down onto his knees while holding onto the counter in the kitchen area of the common room. Patient was on his feet and getting a cup of coffee when staff arrived but did appear unsteady. Patient reported feeling \"light-headed\" and was assisted to a chair. Patient denied injury or pain. Patient reported he did remember eating lunch, per staff, he ate between 50- 75% of his lunch tray. Vitals obtained- /68, P 92, R 18, SPO2 95% RA. Dilma Jenkins NP notified, Nurse Manager TINY Meadows notified.  Spoke with wife Bharathi Shelley, she reports patient had mentioned feeling light-headed to her yesterday, she is concerned it may be due to the invega, message passed to NP.

## 2019-06-13 NOTE — PROGRESS NOTES
Nutrition Assessment    Type and Reason for Visit: Reassess    Nutrition Recommendations:   Continue current diet, Continue current ONS. Nutrition Assessment: Pt improving from a nutritional standpoint , now consuming >75%of most meals, Continue diet/ONS BID. Malnutrition Assessment:  · Malnutrition Status: Meets the criteria for moderate malnutrition  · Context: Acute illness or injury  · Findings of the 6 clinical characteristics of malnutrition (Minimum of 2 out of 6 clinical characteristics is required to make the diagnosis of moderate or severe Protein Calorie Malnutrition based on AND/ASPEN Guidelines):  1. Energy Intake-Greater than 75% of estimated energy requirement, (x 2 days)    2. Weight Loss-10% loss or greater, in 1 month  3. Fat Loss-Mild subcutaneous fat loss, Orbital  4. Muscle Loss-Mild muscle mass loss, Temples (temporalis muscle)  5. Fluid Accumulation-No significant fluid accumulation,    6.  Strength-Not measured    Nutrition Risk Level: Moderate    Nutrient Needs:  · Estimated Daily Total Kcal: 2700-2274(CBW REE 1371 x 1.2 SF)  · Estimated Daily Protein (g): 70-85(CBW 1.2 - 1.4 g/kg)  · Estimated Daily Total Fluid (ml/day): 9186-2995(1ml / kcal)    Nutrition Diagnosis:   · Problem:  Moderate malnutrition, In context of acute illness or injury  · Etiology: related to Cognitive or neurological impairment     Signs and symptoms:  as evidenced by Diet history of poor intake, Weight loss greater than or equal to 5% in 1 month, Mild loss of subcutaneous fat, Mild muscle loss    Objective Information:  · Nutrition-Focused Physical Findings: alert, some confusion, appetite improved, anxious, abdomen WDL, + BS, + I/O  · Wound Type: None  · Current Nutrition Therapies:  · Oral Diet Orders: General   · Oral Diet intake: %  · Oral Nutrition Supplement (ONS) Orders: Standard High Calorie Oral Supplement  · ONS intake: Unable to assess(no documantation of supplements)  · Anthropometric Measures:  · Ht: 5' 8\" (172.7 cm)   · Current Body Wt: 140 lb (63.5 kg)(6/12 standing scale)  · Admission Body Wt: 136 lb (61.7 kg)(first actual)  · Usual Body Wt: 156 lb (70.8 kg)  · % Weight Change:  ,  20# (13%) weight loss x 1 month  · Ideal Body Wt: 154 lb (69.9 kg), % Ideal Body 91%  · BMI Classification: BMI 18.5 - 24.9 Normal Weight    Nutrition Interventions:   Continue current diet, Continue current ONS  Continued Inpatient Monitoring, Education not appropriate at this time, Coordination of Care    Nutrition Evaluation:   · Evaluation: Goal achieved   · Goals: Consume >75% of meals/ONS.     · Monitoring: Meal Intake, Supplement Intake, I&O, Mental Status/Confusion, Weight, Pertinent Labs, Monitor Bowel Function      Electronically signed by Kourtney Mendez RD, LD on 6/13/19 at 2:58 PM    Contact Number: ext [phone]

## 2019-06-13 NOTE — PROGRESS NOTES
DATE OF SERVICE:     6/13/2019    Seth Patton seen today for the purpose of continuation of care. Nursing, social work reports, laboratory studies and vital signs are reviewed. Patient chief complaint today is:             [] Depression      [] Anxiety        [x] Psychosis         [x] Suicidal/Homicidal                         [x] Delusions           [] Aggression          Subjective:     Patient is in bed with covers over head. Will not take off. Irritable and tired and says that he didn't get any sleep last night. Nursing reports 6.5 hours. Patient remains religiously preoccupied and paranoid. Still having SI and AH. Meds compliant and going to groups. Sleep:  [] Good [x] Fair  [] Poor  Appetite:  [] Good [x] Fair  [] Poor    Depression:  [] Mild [x] Moderate [] Severe                [x] Constant [] Sporadic     Anxiety: [] Mild [] Moderate [] Severe    [] Constant [] Sporadic     Delusions: [] Mild [x] Moderate [] Severe     [x] Constant [] Sporadic     [x] Paranoid [] Somatic [] Grandiose     Hallucinations: [] Mild [] Moderate [x] Severe     [] Constant [x] Sporadic    [x] Auditory  [] Visual [] Tactile       Suicidal: [] Constant [x] Sporadic  Homicidal: [] Constant [] Sporadic    Unscheduled Medications     [] Patient Receiving Emergency Medications \" Chemical Restraint\"   [] Requesting PRN medications for anxiety    Psychiatric Review of systems  Delusions:  [] Denies [x] Endorses   Withdrawals:  [x] Denies [] Endorses    Hallucinations: [x] Denies [] Endorses    Extra Pyramidal Symptoms: [x] Denies [] Endorses      Medical Review of Systems:     All other than marked systmes have been reviewed and are all negative.     Constitutional Symptoms: []  fever []  Chills  Skin Symptoms: [] rash []  Pruritus   Eye Symptoms: [] Vision unchanged []  recent vision problems[] blurred vision   Respiratory Symptoms:[] shortness of breath [] cough  Cardiovascular Symptoms:  [] chest pain   [] palpitations   Gastrointestinal Symptoms: []  abdominal pain []  nausea []  vomiting []  diarrhea  Genitourinary Symptoms: []  dysuria  []  hematuria   Musculoskeletal Symptoms: []  back pain []  muscle pain []  joint pain  Neurologic Symptoms: []  headache []  dizziness  Hematolymphoid Symptoms: [] Adenopathy [] Bruises   [] Schimosis       Mental Status Examination:    Cognition:      [x] Alert  [x] Awake  [x] Oriented  [x] Person  [x] Place [] Time      [] drowsy  [] tired  [] lethargic  [] distractable  [] Other     Attention/Concentration:   [x] Attentive  [] Distracted        Memory Recent and Remote: [] Intact   [] Impaired [] Partially Impaired     Language: [] Able to recognize and name objects          [] Unable to recognize and name Objects    Fund of Knowledge:  [] Poor [x]  Fair  [] Good    Speech: [] Normal  [x] Soft  [x] Slow  [] Fast [] Pressured            [] Loud [] Dysarthria  [] Incoherent    Appearance: [] Well Groomed  [x] Casual Dressed  [] Unkept  [] Disheveled          [] Normal weight[x] Thin  [] Overweight  [] Obese           Attitude: [x] Positive  [] Hostile  [] Demanding  [] Guarded  [] Defensive         [x] Cooperative  []  Uncooperative      Behavior:  [x] Normal Gait  [] Walks with Assistance  [] Yudith Chair    [] Walks with Yeimi Loser  [] In Hospital Bed  [x] Sitting in Chair    Muscle-Skeletal:  [] Normal Muscle Tone [x] Muscle Atrophy       [] Abnormal Muscle Movement     Eye Contact:  [] Good eye contact  [x] Intermittent Eye Contact  [] Poor Eye Contact     Mood: [] Depressed  [] Anxious  [] Irritated  [x] Euthymic   [] Angry [] Restless    Affect:  [x] Congruent  [] Incongruent  [] Labile  [] Constricted  [] Flat  [] Bizarre     Thought Process and Association:  [] Logical [x] Illogical       [] Linear and Goal Directed  [x] Tangential  [] Circumstantial     Thought Content:  [x] Denies [] Endorses [] Suicidal [] Homicidal  [x] Delusional      [] Paranoid  [] Somatic  [] Grandiose    Perception: []  None  [x] Auditory   [] Visual  [] tactile   [] olfactory  [] Illusions         Insight: [] Intact  [] Fair  [x] Limited    Judgement:  [] Intact  [] Fair  [x] Limited      Assessment/Plan:        Patient Active Problem List   Diagnosis Code    Esophageal perforation K22.3    Schizophrenia, paranoid type ([facility] [address] F20.0    Moderate protein-energy malnutrition ([facility] [address] E44.0         Plan:    []  Patient is refusing medications  [] Improving as expected   [x] Not improving as expected   [] Worsening    []  At Baseline      Continue current treatment  Encourage activity and groups      Reason for more than one antipsychotic:  [x] N/A  [] 3 failed monotherapy(drugs tried):  [] Cross over to a new antipsychotic  [] Taper to monotherapy from polypharmacy  [] Augmentation of Clozapine therapy due to treatment resistance to single therapy      Signed:  Laurent Trevino  6/13/2019  3:20 PM

## 2019-06-13 NOTE — PLAN OF CARE
Problem: Inadequate oral food/beverage intake (NI-2.1)  Goal: Food and/or Nutrient Delivery  Description: Continue ONS BID  Individualized approach for food/nutrient provision.   Outcome: Met This Shift

## 2019-06-13 NOTE — GROUP NOTE
Group Therapy Note    Date: June 13    Group Start Time: 7167  Group End Time: 1350  Group Topic: Healthy Living/Wellness    [facility] ACUTE BH 2    [place] [facility]); Evan Rios; Katia Dubose RN    Pt attended and participated in afternoon Healthy Living and wellness group.     Group Therapy Note    Attendees: 7/13

## 2019-06-14 PROCEDURE — 6370000000 HC RX 637 (ALT 250 FOR IP): Performed by: PSYCHIATRY & NEUROLOGY

## 2019-06-14 PROCEDURE — 99231 SBSQ HOSP IP/OBS SF/LOW 25: CPT | Performed by: NURSE PRACTITIONER

## 2019-06-14 PROCEDURE — 1240000000 HC EMOTIONAL WELLNESS R&B

## 2019-06-14 PROCEDURE — 6370000000 HC RX 637 (ALT 250 FOR IP): Performed by: NURSE PRACTITIONER

## 2019-06-14 RX ADMIN — DULOXETINE HYDROCHLORIDE 30 MG: 30 CAPSULE, DELAYED RELEASE ORAL at 09:35

## 2019-06-14 RX ADMIN — TRAZODONE HYDROCHLORIDE 100 MG: 50 TABLET ORAL at 20:58

## 2019-06-14 RX ADMIN — PALIPERIDONE 6 MG: 6 TABLET, EXTENDED RELEASE ORAL at 09:35

## 2019-06-14 RX ADMIN — DOCUSATE SODIUM 100 MG: 100 CAPSULE, LIQUID FILLED ORAL at 09:35

## 2019-06-14 ASSESSMENT — PAIN SCALES - GENERAL
PAINLEVEL_OUTOF10: 0
PAINLEVEL_OUTOF10: 0

## 2019-06-14 NOTE — PROGRESS NOTES
Symptoms: []  dysuria  []  hematuria   Musculoskeletal Symptoms: []  back pain []  muscle pain []  joint pain  Neurologic Symptoms: []  headache []  dizziness  Hematolymphoid Symptoms: [] Adenopathy [] Bruises   [] Schimosis       Mental Status Examination:    Cognition:      [x] Alert  [x] Awake  [x] Oriented  [x] Person  [x] Place [] Time      [] drowsy  [] tired  [] lethargic  [] distractable  [] Other     Attention/Concentration:   [x] Attentive  [] Distracted        Memory Recent and Remote: [] Intact   [] Impaired [] Partially Impaired     Language: [] Able to recognize and name objects          [] Unable to recognize and name Objects    Fund of Knowledge:  [] Poor [x]  Fair  [] Good    Speech: [] Normal  [x] Soft  [x] Slow  [] Fast [] Pressured             [] Loud [] Dysarthria  [] Incoherent    Appearance: [] Well Groomed  [x] Casual Dressed  [] Unkept  [x] Disheveled          [] Normal weight[x] Thin  [] Overweight  [] Obese           Attitude: [x] Positive  [] Hostile  [] Demanding  [] Guarded  [] Defensive         [x] Cooperative  []  Uncooperative      Behavior:  [x] Normal Gait  [] Walks with Assistance  [] Yudith Chair    [] Walks with Mikhail Sos  [] In Hospital Bed  [x] Sitting in Chair    Muscle-Skeletal:  [] Normal Muscle Tone [x] Muscle Atrophy       [] Abnormal Muscle Movement     Eye Contact:  [] Good eye contact  [x] Intermittent Eye Contact  [] Poor Eye Contact     Mood: [] Depressed  [] Anxious  [] Irritated  [x] Euthymic   [] Angry [] Restless    Affect:  [x] Congruent  [] Incongruent  [] Labile  [] Constricted  [] Flat  [] Bizarre     Thought Process and Association:  [] Logical [x] Illogical       [] Linear and Goal Directed  [] Tangential  [x] Circumstantial     Thought Content:  [x] Denies [] Endorses [] Suicidal [] Homicidal  [x] Delusional      [x] Paranoid  [x] Episcopal  [] Grandiose    Perception: [x]  None  [] Auditory   [] Visual  [] tactile   [] olfactory  [] Illusions         Insight: [] Intact  [] Fair  [x] Limited    Judgement:  [] Intact  [] Fair  [x] Limited      Assessment/Plan:        Patient Active Problem List   Diagnosis Code    Esophageal perforation K22.3    Schizophrenia, paranoid type ([facility] [address] F20.0    Moderate protein-energy malnutrition ([facility] [address] E44.0         Plan:    []  Patient is refusing medications  [x] Improving as expected   [] Not improving as expected   [] Worsening    []  At Baseline      Continue current treatment  Encourage activity and groups      Reason for more than one antipsychotic:  [x] N/A  [] 3 failed monotherapy(drugs tried):  [] Cross over to a new antipsychotic  [] Taper to monotherapy from polypharmacy  [] Augmentation of Clozapine therapy due to treatment resistance to single therapy      Signed:  Cristhian Silverman  6/14/2019  12:08 PM

## 2019-06-14 NOTE — PLAN OF CARE
Patient has been resting for most of this shift but has been on the unit for meals. Patient's thought process seems slowed and he needs frequent encouragement and reminders that he is allowed to ask for things he needs or wants such as juice or towels. Patient reported mood is \"about the same\" but also stated he feels \"better but not 100%\". While his family was here for visitation he left them abruptly to return to his room. Patient denies SI, HI or hallucinations. He reports occasional dizziness but said he has not been drinking water, increased fluid intake was encouraged. Patient was provided with juice and a water pitcher.

## 2019-06-15 ENCOUNTER — APPOINTMENT (OUTPATIENT)
Dept: GENERAL RADIOLOGY | Age: 74
DRG: 885 | End: 2019-06-15
Payer: MEDICARE

## 2019-06-15 PROCEDURE — 99231 SBSQ HOSP IP/OBS SF/LOW 25: CPT | Performed by: NURSE PRACTITIONER

## 2019-06-15 PROCEDURE — 6370000000 HC RX 637 (ALT 250 FOR IP): Performed by: NURSE PRACTITIONER

## 2019-06-15 PROCEDURE — 6370000000 HC RX 637 (ALT 250 FOR IP): Performed by: PSYCHIATRY & NEUROLOGY

## 2019-06-15 PROCEDURE — 1240000000 HC EMOTIONAL WELLNESS R&B

## 2019-06-15 RX ADMIN — DOCUSATE SODIUM 100 MG: 100 CAPSULE, LIQUID FILLED ORAL at 09:56

## 2019-06-15 RX ADMIN — TRAZODONE HYDROCHLORIDE 100 MG: 50 TABLET ORAL at 21:17

## 2019-06-15 RX ADMIN — DULOXETINE HYDROCHLORIDE 30 MG: 30 CAPSULE, DELAYED RELEASE ORAL at 09:56

## 2019-06-15 RX ADMIN — PALIPERIDONE 6 MG: 6 TABLET, EXTENDED RELEASE ORAL at 09:56

## 2019-06-15 ASSESSMENT — PAIN SCALES - GENERAL
PAINLEVEL_OUTOF10: 0
PAINLEVEL_OUTOF10: 0

## 2019-06-15 NOTE — PROGRESS NOTES
DATE OF SERVICE:     6/15/2019    Ellen Fry seen today for the purpose of continuation of care. Nursing, social work reports, laboratory studies and vital signs are reviewed. Patient chief complaint today is:             [] Depression      [] Anxiety        [] Psychosis         [] Suicidal/Homicidal                         [x] Delusions           [] Aggression          Subjective:     Patient states that he is feeling better. Not hearing voices or suicidal. States that he feels better. Visited with family last evening and left them abruptly and went to his room. Patient remains religiously preoccupied and paranoid. Med compliant and going to groups. Sleep:  [] Good [x] Fair  [] Poor  Appetite:  [] Good [x] Fair  [] Poor    Depression:  [] Mild [x] Moderate [] Severe                [x] Constant [] Sporadic     Anxiety: [] Mild [] Moderate [] Severe    [] Constant [] Sporadic     Delusions: [] Mild [x] Moderate [] Severe     [x] Constant [] Sporadic     [x] Paranoid [] Somatic [] Grandiose     Hallucinations: [] Mild [] Moderate [] Severe     [] Constant [] Sporadic    [] Auditory  [] Visual [] Tactile       Suicidal: [] Constant [] Sporadic  Homicidal: [] Constant [] Sporadic    Unscheduled Medications     [] Patient Receiving Emergency Medications \" Chemical Restraint\"   [] Requesting PRN medications for anxiety    Psychiatric Review of systems  Delusions:  [] Denies [x] Endorses   Withdrawals:  [x] Denies [] Endorses    Hallucinations: [x] Denies [] Endorses    Extra Pyramidal Symptoms: [x] Denies [] Endorses      Medical Review of Systems:     All other than marked systmes have been reviewed and are all negative.     Constitutional Symptoms: []  fever []  Chills  Skin Symptoms: [] rash []  Pruritus   Eye Symptoms: [] Vision unchanged []  recent vision problems[] blurred vision   Respiratory Symptoms:[] shortness of breath [] cough  Cardiovascular Symptoms:  [] chest pain   [] palpitations Gastrointestinal Symptoms: []  abdominal pain []  nausea []  vomiting []  diarrhea  Genitourinary Symptoms: []  dysuria  []  hematuria   Musculoskeletal Symptoms: []  back pain []  muscle pain []  joint pain  Neurologic Symptoms: []  headache []  dizziness  Hematolymphoid Symptoms: [] Adenopathy [] Bruises   [] Schimosis       Mental Status Examination:    Cognition:      [x] Alert  [x] Awake  [x] Oriented  [x] Person  [x] Place [] Time      [] drowsy  [] tired  [] lethargic  [] distractable  [] Other     Attention/Concentration:   [x] Attentive  [] Distracted        Memory Recent and Remote: [] Intact   [] Impaired [] Partially Impaired     Language: [] Able to recognize and name objects          [] Unable to recognize and name Objects    Fund of Knowledge:  [] Poor [x]  Fair  [] Good    Speech: [] Normal  [x] Soft  [x] Slow  [] Fast [] Pressured             [] Loud [] Dysarthria  [] Incoherent    Appearance: [] Well Groomed  [x] Casual Dressed  [] Unkept  [x] Disheveled          [] Normal weight[x] Thin  [] Overweight  [] Obese           Attitude: [x] Positive  [] Hostile  [] Demanding  [] Guarded  [] Defensive         [x] Cooperative  []  Uncooperative      Behavior:  [x] Normal Gait  [] Walks with Assistance  [] Yudith Chair    [] Walks with García Pea  [] In Hospital Bed  [x] Sitting in Chair    Muscle-Skeletal:  [] Normal Muscle Tone [x] Muscle Atrophy       [] Abnormal Muscle Movement     Eye Contact:  [] Good eye contact  [x] Intermittent Eye Contact  [] Poor Eye Contact     Mood: [] Depressed  [] Anxious  [] Irritated  [x] Euthymic   [] Angry [] Restless    Affect:  [x] Congruent  [] Incongruent  [] Labile  [] Constricted  [] Flat  [] Bizarre     Thought Process and Association:  [] Logical [x] Illogical       [] Linear and Goal Directed  [] Tangential  [x] Circumstantial     Thought Content:  [x] Denies [] Endorses [] Suicidal [] Homicidal  [x] Delusional      [x] Paranoid  [x] Protestant  []

## 2019-06-15 NOTE — PLAN OF CARE
Pt had unwitnessed fall.      Problem: Falls - Risk of:  Goal: Will remain free from falls  Description  Will remain free from falls  Outcome: Not Met This Shift

## 2019-06-15 NOTE — PLAN OF CARE
Patient is resting in bed with eyes closed. No signs of distress or discomfort. No unit issues. Safety needs met. Will continue to monitor closely.           Problem: Altered Mood, Psychotic Behavior:  Goal: Absence of self-harm  Description  Absence of self-harm  6/15/2019 0039 by Anup Celestin RN  Outcome: Met This Shift         Electronically signed by Anup Celestin RN on 6/15/2019 at 12:39 AM

## 2019-06-15 NOTE — PLAN OF CARE
Denies SI, HI, and hallucinations. States \"Not at the moment. I'm resting. \" When asked if patient was having any anxiety or depression, patient stated \"A little bit\" then rated both anxiety and depression 10/10. Isolative to room this evening. Medications taken without issue. No PRNs administered. No complaints or concerns verbalized at this time. Will continue to monitor and offer support.       Problem: Altered Mood, Psychotic Behavior:  Goal: Able to demonstrate trust by eating, participating in treatment and following staff's direction  Description  Able to demonstrate trust by eating, participating in treatment and following staff's direction  6/14/2019 2147 by Abdirahman Mcgregor RN  Outcome: Not Met This Shift         Electronically signed by Abdirahman Mcgregor RN on 6/14/2019 at 9:49 PM

## 2019-06-15 NOTE — PROGRESS NOTES
Pt refused to have thumb and bilateral knees x-rayed. Pt stated \"they are fine, I'm fine, I don't need to go. \" Pt signed refusal of treatment form and placed in chart.

## 2019-06-15 NOTE — PROGRESS NOTES
38 Pt had unwitnessed fall. Pt stated he landed on both needs and was reaching for the railing. Bilateral knees and L thumb red. Antonella Rater provided to pt. Fluids encouraged, water pitcher provided. Vital signs done. [address] Dr. Sarah Mariano answering service notified, awaiting response. Dr. Bry Nascimento notified of fall, orders received. Charlotte Griffin, nurse manager notified. Pt's wife, Marquise Sams, notified of fall.

## 2019-06-16 PROCEDURE — 99231 SBSQ HOSP IP/OBS SF/LOW 25: CPT | Performed by: NURSE PRACTITIONER

## 2019-06-16 PROCEDURE — 6370000000 HC RX 637 (ALT 250 FOR IP): Performed by: NURSE PRACTITIONER

## 2019-06-16 PROCEDURE — 6370000000 HC RX 637 (ALT 250 FOR IP): Performed by: PSYCHIATRY & NEUROLOGY

## 2019-06-16 PROCEDURE — 1240000000 HC EMOTIONAL WELLNESS R&B

## 2019-06-16 RX ADMIN — PALIPERIDONE 6 MG: 6 TABLET, EXTENDED RELEASE ORAL at 10:16

## 2019-06-16 RX ADMIN — DULOXETINE HYDROCHLORIDE 30 MG: 30 CAPSULE, DELAYED RELEASE ORAL at 10:16

## 2019-06-16 RX ADMIN — DOCUSATE SODIUM 100 MG: 100 CAPSULE, LIQUID FILLED ORAL at 10:16

## 2019-06-16 ASSESSMENT — PAIN SCALES - GENERAL: PAINLEVEL_OUTOF10: 0

## 2019-06-16 NOTE — PLAN OF CARE
Pt denies SI, HI, and AVH. Pt is irritable at times and isolative to room. Fluids encouraged and provided to pt. Pt educated on fall precautions. Bed alarm in place. Pt ambulating with wheeled walker. Thomas hose placed.      Problem: Altered Mood, Psychotic Behavior:  Goal: Absence of self-harm  Description  Absence of self-harm  6/16/2019 1004 by Yahaira Romero RN  Outcome: Met This Shift  6/16/2019 0233 by Anup Celestin RN  Outcome: Met This Shift     Problem: Altered Mood, Psychotic Behavior:  Goal: Able to demonstrate trust by eating, participating in treatment and following staff's direction  Description  Able to demonstrate trust by eating, participating in treatment and following staff's direction  Outcome: Ongoing  Goal: Ability to interact with others will improve  Description  Ability to interact with others will improve  Outcome: Ongoing     Problem: Altered Mood, Psychotic Behavior:  Goal: Able to verbalize reality based thinking  Description  Able to verbalize reality based thinking  6/15/2019 2306 by Anup Celestin RN  Outcome: Not Met This Shift  6/15/2019 2241 by Anup Celestin RN  Outcome: Not Met This Shift

## 2019-06-16 NOTE — CONSULTS
Cardiology consult  Dr. Kyle Seo      Reason for Consult: Orthostatic hypotension  Requesting Physician: ELENA Noel CNP  CHIEF COMPLAINT: Dizziness  History Obtained From: patient  HISTORY OF PRESENT ILLNESS:   Patient is 72years old male with history of a DVT, has been complaining of dizziness, cardiology was consulted. Dizziness, comes and goes,Mostly orthostatic, no recent syncopal episodes, admits to remote syncopal episodes, no chest pain, no shortness of breath, no palpitations, no pedal edema, no PND, no orthopnea. Past Medical History:   Diagnosis Date    DVT of lower extremity (deep venous thrombosis) ([facility] [address]     left    Influenza 02/2019       No past surgical history on file.       Current Facility-Administered Medications:     paliperidone (INVEGA) extended release tablet 6 mg, 6 mg, Oral, Daily, ELENA Noel CNP, 6 mg at 06/16/19 1016    DULoxetine (CYMBALTA) extended release capsule 30 mg, 30 mg, Oral, Daily, ELENA Noel CNP, 30 mg at 06/16/19 1016    docusate sodium (COLACE) capsule 100 mg, 100 mg, Oral, Daily, Malina Cunha MD, 100 mg at 06/16/19 1016    acetaminophen (TYLENOL) tablet 650 mg, 650 mg, Oral, Q4H PRN, Haley Cosme MD    LORazepam (ATIVAN) tablet 0.5 mg, 0.5 mg, Oral, Q8H PRN, Haley Cosme MD    haloperidol lactate (HALDOL) injection 2 mg, 2 mg, Intramuscular, Q4H PRN, Haley Cosme MD    benztropine mesylate (COGENTIN) injection 2 mg, 2 mg, Intramuscular, BID PRN, Haley Cosme MD    magnesium hydroxide (MILK OF MAGNESIA) 400 MG/5ML suspension 30 mL, 30 mL, Oral, Daily PRN, Malina Cunha MD    aluminum & magnesium hydroxide-simethicone (MAALOX) 200-200-20 MG/5ML suspension 30 mL, 30 mL, Oral, PRN, Malina Cunha MD    Allergies as of 06/05/2019    (No Known Allergies)       Social History     Socioeconomic History    Marital status:      Spouse name: Not on file    Number of children: Not on file    Years of education: Not on file    Highest education level: Not on file   Occupational History    Not on file   Social Needs    Financial resource strain: Not on file    Food insecurity:     Worry: Not on file     Inability: Not on file    Transportation needs:     Medical: Not on file     Non-medical: Not on file   Tobacco Use    Smoking status: Never Smoker    Smokeless tobacco: Never Used   Substance and Sexual Activity    Alcohol use: No    Drug use: No    Sexual activity: Not on file   Lifestyle    Physical activity:     Days per week: Not on file     Minutes per session: Not on file    Stress: Not on file   Relationships    Social connections:     Talks on phone: Not on file     Gets together: Not on file     Attends Rastafarian service: Not on file     Active member of club or organization: Not on file     Attends meetings of clubs or organizations: Not on file     Relationship status: Not on file    Intimate partner violence:     Fear of current or ex partner: Not on file     Emotionally abused: Not on file     Physically abused: Not on file     Forced sexual activity: Not on file   Other Topics Concern    Not on file   Social History Narrative    Not on file       No family history on file.     REVIEW OF SYSTEMS:     CONSTITUTIONAL:  negative for  fevers, chills, sweats, + fatigue  EYES:  negative for  double vision, blurred vision and blind spots  HEENT:  negative for  tinnitus, earaches, nasal congestion and epistaxis  RESPIRATORY:  negative for  dry cough, cough with sputum, dyspnea, wheezing and hemoptysis  CARDIOVASCULAR: as per HPI  GASTROINTESTINAL:  negative for nausea, vomiting, diarrhea, constipation, pruritus and jaundice  GENITOURINARY:  negative for frequency, dysuria, nocturia, urinary incontinence and hesitancy  HEMATOLOGIC/LYMPHATIC:  negative for easy bruising, bleeding, lymphadenopathy and petechiae  ALLERGIC/IMMUNOLOGIC: negative for urticaria, hay fever and angioedema  ENDOCRINE:  negative for heat intolerance, cold intolerance, tremor, hair loss and diabetic symptoms including neither polyuria nor polydipsia nor blurred vision  MUSCULOSKELETAL:  negative for  myalgias, arthralgias, joint swelling, stiff joints and decreased range of motion  NEUROLOGICAL:  negative for memory problems, speech problems, visual disturbance, dysphagia, weakness and numbness      PHYSICAL EXAM:   CONSTITUTIONAL:  awake, alert, cooperative, no apparent distress, and appears stated age  EYES:  lids and lashes normal and pupils equal, round and reactive to light, anicteric sclerae  HEAD:  normocepalic, without obvious abnormality, atraumatic, pink, moist mucous membranes. NECK:  Supple, symmetrical, trachea midline, no adenopathy, thyroid symmetric, not enlarged and no tenderness, skin normal  HEMATOLOGIC/LYMPHATICS:  no cervical lymphadenopathy and no supraclavicular lymphadenopathy  LUNGS:  No increased work of breathing, good air exchange, clear to auscultation bilaterally, no crackles or wheezing  CARDIOVASCULAR:  Normal apical impulse, regular rate and rhythm, normal S1 and S2, no S3 or S4, and no murmur noted and no JVD, no carotid bruit, no pedal edema, good carotid upstroke bilaterally. ABDOMEN:  Soft, nontender, no masses, no hepatomegaly or splenomegaly, BS+  CHEST: nontender to palpation, expands symmetrically  MUSCULOSKELETAL:  No clubbing no cyanosis. 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 age Undetermined  ECHO: Not performed to date  Stress Test: Not performed to date  Angiography: Not performed to date  Cardiology Labs:   BMP:    Lab Results   Component Value Date     06/07/2019    K 4.0 06/07/2019    K 3.9 02/06/2019     06/07/2019    CO2 31 06/07/2019    BUN 18 06/07/2019     CMP:    Lab Results   Component Value Date     06/07/2019    K 4.0 06/07/2019    K 3.9 02/06/2019     06/07/2019    CO2 31 06/07/2019    BUN 18 06/07/2019    PROT 6.6 06/07/2019     CBC:    Lab Results   Component Value Date    WBC 6.6 06/07/2019    RBC 4.61 06/07/2019    HGB 13.1 06/07/2019    HCT 41.3 06/07/2019    MCV 89.6 06/07/2019    RDW 13.4 06/07/2019     06/07/2019     PT/INR:  No results found for: PTINR  PT/INR Warfarin:  No components found for: PTPATWAR, PTINRWAR  PTT:    Lab Results   Component Value Date    APTT 25.8 02/06/2019     PTT Heparin:  No components found for: APTTHEP  Magnesium:  No results found for: MG  TSH:    Lab Results   Component Value Date    TSH 4.020 06/05/2019     TROPONIN:  No components found for: TROP  BNP:  No results found for: BNP  FASTING LIPID PANEL:    Lab Results   Component Value Date    CHOL 171 06/06/2019    HDL 50 06/06/2019    TRIG 125 06/06/2019     XR KNEE LEFT (1-2 VIEWS)    (Results Pending)   XR KNEE RIGHT (1-2 VIEWS)    (Results Pending)   XR FINGER LEFT (MIN 2 VIEWS)    (Results Pending)     I have personally reviewed the laboratory, cardiac diagnostic and radiographic testing as outlined above:      IMPRESSION:  1. Orthostatic hypotension: Suspect iatrogenic, but they're often adjusting his medications  2. Dizziness: improved after adjusting medications  3. History of DVT  4. Major depression    RECOMMENDATIONS:   1. Continue current treatment  2. Advised to increase by mouth fluid intake and liberalize salt intake  3.   Further cardiac recommendations will be forthcoming pending his clinical course and diagnostic test findings  I have reviewed my findings and recommendations with patient    Thank you for the consult  Electronically signed by Alysha Jolley MD on 6/16/2019 at 4:23 PM  NOTE: This report was transcribed using voice recognition software.  Every effort was made to ensure accuracy; however, inadvertent computerized transcription errors may be present

## 2019-06-16 NOTE — PROGRESS NOTES
Declined to attend goals group.  Identified goal for the day as to [facility] it through the day\"

## 2019-06-16 NOTE — PLAN OF CARE
Patient is resting in bed with eyes closed. No signs of distress or discomfort. No unit issues. Safety needs met. Will continue to monitor closely.           Problem: Altered Mood, Psychotic Behavior:  Goal: Absence of self-harm  Description  Absence of self-harm  Outcome: Met This Shift         Electronically signed by Monet Malloy RN on 6/16/2019 at 2:33 AM

## 2019-06-16 NOTE — PROGRESS NOTES
DATE OF SERVICE:     6/16/2019    Tilden Babinski seen today for the purpose of continuation of care. Nursing, social work reports, laboratory studies and vital signs are reviewed. Patient chief complaint today is:             [] Depression      [] Anxiety        [] Psychosis         [] Suicidal/Homicidal                         [x] Delusions           [] Aggression          Subjective:     Patient is more organized. Denies hearing voices or suicidal. Can be irritable at times. Patient remains religiously preoccupied and paranoid. Med compliant and going to groups. Patient fell again last night. Cardiology consulted. Sleep:  [] Good [x] Fair  [] Poor  Appetite:  [] Good [x] Fair  [] Poor    Depression:  [] Mild [x] Moderate [] Severe                [x] Constant [] Sporadic     Anxiety: [] Mild [] Moderate [] Severe    [] Constant [] Sporadic     Delusions: [] Mild [x] Moderate [] Severe     [x] Constant [] Sporadic     [x] Paranoid [] Somatic [] Grandiose     Hallucinations: [] Mild [] Moderate [] Severe     [] Constant [] Sporadic    [] Auditory  [] Visual [] Tactile       Suicidal: [] Constant [] Sporadic  Homicidal: [] Constant [] Sporadic    Unscheduled Medications     [] Patient Receiving Emergency Medications \" Chemical Restraint\"   [] Requesting PRN medications for anxiety    Psychiatric Review of systems  Delusions:  [] Denies [x] Endorses   Withdrawals:  [x] Denies [] Endorses    Hallucinations: [x] Denies [] Endorses    Extra Pyramidal Symptoms: [x] Denies [] Endorses      Medical Review of Systems:     All other than marked systmes have been reviewed and are all negative.     Constitutional Symptoms: []  fever []  Chills  Skin Symptoms: [] rash []  Pruritus   Eye Symptoms: [] Vision unchanged []  recent vision problems[] blurred vision   Respiratory Symptoms:[] shortness of breath [] cough  Cardiovascular Symptoms:  [] chest pain   [] palpitations   Gastrointestinal Symptoms: []  abdominal pain []  nausea []  vomiting []  diarrhea  Genitourinary Symptoms: []  dysuria  []  hematuria   Musculoskeletal Symptoms: []  back pain []  muscle pain []  joint pain  Neurologic Symptoms: []  headache []  dizziness  Hematolymphoid Symptoms: [] Adenopathy [] Bruises   [] Schimosis       Mental Status Examination:    Cognition:      [x] Alert  [x] Awake  [x] Oriented  [x] Person  [x] Place [] Time      [] drowsy  [] tired  [] lethargic  [] distractable  [] Other     Attention/Concentration:   [x] Attentive  [] Distracted        Memory Recent and Remote: [] Intact   [] Impaired [] Partially Impaired     Language: [] Able to recognize and name objects          [] Unable to recognize and name Objects    Fund of Knowledge:  [] Poor [x]  Fair  [] Good    Speech: [] Normal  [x] Soft  [x] Slow  [] Fast [] Pressured             [] Loud [] Dysarthria  [] Incoherent    Appearance: [] Well Groomed  [x] Casual Dressed  [] Unkept  [x] Disheveled          [] Normal weight[x] Thin  [] Overweight  [] Obese           Attitude: [x] Positive  [] Hostile  [] Demanding  [] Guarded  [] Defensive         [x] Cooperative  []  Uncooperative      Behavior:  [x] Normal Gait  [] Walks with Assistance  [] Yudith Chair    [] Walks with Mikhail Sos  [] In Hospital Bed  [x] Sitting in Chair    Muscle-Skeletal:  [] Normal Muscle Tone [x] Muscle Atrophy       [] Abnormal Muscle Movement     Eye Contact:  [] Good eye contact  [x] Intermittent Eye Contact  [] Poor Eye Contact     Mood: [] Depressed  [] Anxious  [] Irritated  [x] Euthymic   [] Angry [] Restless    Affect:  [x] Congruent  [] Incongruent  [] Labile  [] Constricted  [] Flat  [] Bizarre     Thought Process and Association:  [] Logical [x] Illogical       [] Linear and Goal Directed  [] Tangential  [x] Circumstantial     Thought Content:  [] Denies [x] Endorses [] Suicidal [] Homicidal  [x] Delusional      [x] Paranoid  [x] Mandaen  [] Grandiose    Perception: [x]  None  [] Auditory   [] Visual [] tactile   [] olfactory  [] Illusions         Insight: [] Intact  [] Fair  [x] Limited    Judgement:  [] Intact  [] Fair  [x] Limited      Assessment/Plan:        Patient Active Problem List   Diagnosis Code    Esophageal perforation K22.3    Schizophrenia, paranoid type ([facility] [address] F20.0    Moderate protein-energy malnutrition ([facility] [address] E44.0         Plan:    []  Patient is refusing medications  [x] Improving as expected   [] Not improving as expected   [] Worsening    []  At Baseline      Continue current treatment  Encourage fluids.   Await Cardiology input  Encourage activity and groups      Reason for more than one antipsychotic:  [x] N/A  [] 3 failed monotherapy(drugs tried):  [] Cross over to a new antipsychotic  [] Taper to monotherapy from polypharmacy  [] Augmentation of Clozapine therapy due to treatment resistance to single therapy      Signed:  Noam Castro  6/16/2019  4:10 PM

## 2019-06-16 NOTE — PLAN OF CARE
Denies SI, HI, and hallucinations. Patient is paranoid and pressed call light stating \"I don't belong here. You can give someone else my room. \" Explained to patient that is his room and he is staying here until discharged by the doctor. Patient came out on the unit for snacks. Medication compliant. No PRNs administered. No complaints or concerns verbalized at this time. Will continue to monitor and offer support.

## 2019-06-16 NOTE — PLAN OF CARE
Problem: Altered Mood, Psychotic Behavior:  Goal: Able to verbalize reality based thinking  Description  Able to verbalize reality based thinking  6/15/2019 2241 by Ortega Leon RN  Outcome: Not Met This Shift

## 2019-06-17 PROCEDURE — 6370000000 HC RX 637 (ALT 250 FOR IP): Performed by: PSYCHIATRY & NEUROLOGY

## 2019-06-17 PROCEDURE — 6370000000 HC RX 637 (ALT 250 FOR IP): Performed by: NURSE PRACTITIONER

## 2019-06-17 PROCEDURE — 1240000000 HC EMOTIONAL WELLNESS R&B

## 2019-06-17 PROCEDURE — 99231 SBSQ HOSP IP/OBS SF/LOW 25: CPT | Performed by: NURSE PRACTITIONER

## 2019-06-17 RX ORDER — VALPROIC ACID 250 MG/5ML
250 SOLUTION ORAL 2 TIMES DAILY
Status: DISCONTINUED | OUTPATIENT
Start: 2019-06-17 | End: 2019-06-18

## 2019-06-17 RX ADMIN — VALPROIC ACID 250 MG: 250 SOLUTION ORAL at 13:25

## 2019-06-17 RX ADMIN — PALIPERIDONE 6 MG: 6 TABLET, EXTENDED RELEASE ORAL at 08:32

## 2019-06-17 RX ADMIN — DOCUSATE SODIUM 100 MG: 100 CAPSULE, LIQUID FILLED ORAL at 08:32

## 2019-06-17 RX ADMIN — DULOXETINE HYDROCHLORIDE 30 MG: 30 CAPSULE, DELAYED RELEASE ORAL at 08:32

## 2019-06-17 ASSESSMENT — PAIN SCALES - GENERAL: PAINLEVEL_OUTOF10: 0

## 2019-06-17 NOTE — GROUP NOTE
Patient attended goals group and stated daily goal as to be more social.                                                                      Group Therapy Note    Date: June 17    Group Start Time: 1000  Group End Time: 1045  Group Topic: Psychoeducation    Y[facility]S OP NIKUNJ Huston    Number of participants: 13  Type of group: Psychoeducation  Mode of intervention: Education, Support, Socialization, Exploration, Clarifying and Problem-solving  Topic: Goals and Wellness  Objective: To id positive coping skills to use on a daily basis for wellness. Group Therapy Note:     Notes: Attended group and was able to participate in discussion on coping to improve wellness. Status After Intervention:  Improved    Participation Level:  Active Listener, Interactive and Minimal    Participation Quality: Attentive      Speech:  hesitant      Thought Process/Content: Logical      Affective Functioning: Flat      Mood: anxious and depressed      Level of consciousness:  Alert      Response to Learning: Progressing to goal      Endings: None Reported    Modes of Intervention: Education      Discipline Responsible: Psychoeducational Specialist      Signature:  NIKUNJ San

## 2019-06-17 NOTE — PLAN OF CARE
Patient is resting in bed with eyes closed. No signs of distress or discomfort. No unit issues. Safety needs met. Will continue to monitor closely.           Problem: Altered Mood, Psychotic Behavior:  Goal: Absence of self-harm  Description  Absence of self-harm  Outcome: Met This Shift     Problem: Falls - Risk of:  Goal: Absence of physical injury  Description  Absence of physical injury  Outcome: Met This Shift           Electronically signed by Cammie Almonte RN on 6/17/2019 at 1:09 AM

## 2019-06-17 NOTE — PLAN OF CARE
Patient cooperative, evasive. Patient denies SI,HI and hallucinations. Patient has a passive death wish. Patient responses are very vague, usually stating  \"I don't know probably\" or \"yeah probably\" to most questions asked. Patient on 2 hour lockout but frequently asks hen he can go back into his room and lay down. Patient appears paranoid and suspicious of other patient on the unit. Patient took medications but does not participate in group.  Will continue to observe and support    Problem: Altered Mood, Psychotic Behavior:  Goal: Able to verbalize decrease in frequency and intensity of hallucinations  Description  Able to verbalize decrease in frequency and intensity of hallucinations  Outcome: Met This Shift  Goal: Absence of self-harm  Description  Absence of self-harm  6/17/2019 0913 by Sim Borrego RN  Outcome: Met This Shift     Problem: Falls - Risk of:  Goal: Will remain free from falls  Description  Will remain free from falls  Outcome: Met This Shift

## 2019-06-17 NOTE — PROGRESS NOTES
Assisted patient to bathroom and back to bed. Instructed patient to remove DEISY hose for the night and to put back on in the morning. Patient refused. States \"They're keeping my legs warm. \" Offered patient an extra blanket to keep warm and he also refused this. States \"I'm fine. \" Will continue to monitor and offer support.

## 2019-06-17 NOTE — PROGRESS NOTES
DATE OF SERVICE:     6/17/2019    Apryl Jones seen today for the purpose of continuation of care. Nursing, social work reports, laboratory studies and vital signs are reviewed. Patient chief complaint today is:             [x] Depression      [] Anxiety        [] Psychosis         [x] Suicidal/Homicidal                         [x] Delusions           [] Aggression          Subjective:     Patient states he is \"sad, I was born that way. \"  Patient is medication compliant. Medications adjusted. Sleep:  [] Good [x] Fair  [] Poor  Appetite:  [] Good [x] Fair  [] Poor    Depression:  [] Mild [] Moderate [x] Severe                [x] Constant [] Sporadic     Anxiety: [] Mild [] Moderate [] Severe    [] Constant [] Sporadic     Delusions: [] Mild [x] Moderate [] Severe     [] Constant [x] Sporadic     [x] Paranoid [] Somatic [] Grandiose     Hallucinations: [] Mild [] Moderate [] Severe     [] Constant [] Sporadic    [] Auditory  [] Visual [] Tactile       Suicidal: [] Constant [x] Sporadic  Homicidal: [] Constant [] Sporadic    Unscheduled Medications     [] Patient Receiving Emergency Medications \" Chemical Restraint\"   [] Requesting PRN medications for anxiety    Psychiatric Review of systems  Delusions:  [] Denies [] Endorses   Withdrawals:  [] Denies [] Endorses    Hallucinations: [] Denies [] Endorses    Extra Pyramidal Symptoms: [] Denies [] Endorses      Medical Review of Systems:     All other than marked systmes have been reviewed and are all negative.     Constitutional Symptoms: []  fever []  Chills  Skin Symptoms: [] rash []  Pruritus   Eye Symptoms: [] Vision unchanged []  recent vision problems[] blurred vision   Respiratory Symptoms:[] shortness of breath [] cough  Cardiovascular Symptoms:  [] chest pain   [] palpitations   Gastrointestinal Symptoms: []  abdominal pain []  nausea []  vomiting []  diarrhea  Genitourinary Symptoms: []  dysuria  []  hematuria   Musculoskeletal Symptoms: [] back pain []  muscle pain []  joint pain  Neurologic Symptoms: []  headache []  dizziness  Hematolymphoid Symptoms: [] Adenopathy [] Bruises   [] Schimosis       Mental Status Examination:    Cognition:      [x] Alert  [x] Awake  [x] Oriented  [x] Person  [x] Place [] Time      [] drowsy  [] tired  [] lethargic  [] distractable  [] Other     Attention/Concentration:   [x] Attentive  [] Distracted        Memory Recent and Remote: [x] Intact   [] Impaired [] Partially Impaired     Language: [] Able to recognize and name objects          [] Unable to recognize and name Objects    Fund of Knowledge:  [] Poor [x]  Fair  [] Good    Speech: [] Normal  [x] Soft  [x] Slow  [] Fast [] Pressured             [] Loud [] Dysarthria  [] Incoherent    Appearance: [] Well Groomed  [x] Casual Dressed  [] Unkept  [] Disheveled          [] Normal weight[x] Thin  [] Overweight  [] Obese           Attitude: [] Positive  [] Hostile  [] Demanding  [] Guarded  [] Defensive         [x] Cooperative  []  Uncooperative       Behavior:  [] Normal Gait  [] Walks with Assistance  [] Yudith Chair    [] Walks with Mikhail Sos  [x] In Hospital Bed  [] Sitting in Chair    Muscle-Skeletal:  [x] Normal Muscle Tone [x] Muscle Atrophy       [] Abnormal Muscle Movement     Eye Contact:  [] Good eye contact  [x] Intermittent Eye Contact  [] Poor Eye Contact     Mood: [x] Depressed  [] Anxious  [] Irritated  [] Euthymic   [] Angry [] Restless    Affect:  [x] Congruent  [] Incongruent  [] Labile  [] Constricted  [] Flat  [] Bizarre     Thought Process and Association:  [] Logical [] Illogical       [] Linear and Goal Directed  [] Tangential  [x] Circumstantial     Thought Content:  [] Denies [x] Endorses [x] Suicidal [] Homicidal  [x] Delusional      [x] Paranoid  [] Cheondoism  [] Grandiose    Perception: [x]  None  [] Auditory   [] Visual  [] tactile   [] olfactory  [] Illusions         Insight: [] Intact  [] Fair  [x] Limited    Judgement:  [] Intact  [] Fair  [x] Limited      Assessment/Plan:        Patient Active Problem List   Diagnosis Code    Esophageal perforation K22.3    Schizophrenia, paranoid type ([facility] [address] F20.0    Moderate protein-energy malnutrition ([facility] [address] E44.0         Plan:    []  Patient is refusing medications  [x] Improving as expected   [] Not improving as expected   [] Worsening    []  At Baseline      Will d/c cymbalta and start Depakote 250 mg BID        Reason for more than one antipsychotic:  [x] N/A  [] 3 failed monotherapy(drugs tried):  [] Cross over to a new antipsychotic  [] Taper to monotherapy from polypharmacy  [] Augmentation of Clozapine therapy due to treatment resistance to single therapy      Signed:  Kishor Romero  6/17/2019  11:48 AM

## 2019-06-17 NOTE — PLAN OF CARE
Reiterates defeatist themes. Re interview on self isolation and passively letting his health go ( on issue of s.i ), states \"how can I want to kill myself if i'm already dead? \"        however, re things positive, he speaks somewhat positively re his roommate and how they both knew where the other ( and their fathers ) worked ( in the [address] [place] [address] [employer] ) . Comes out for meals but eats/ drinks \"no very much\" today. During groups, have noticed that Samuel Antoine occasionally walks past, taking short glances and observing what some peers are doing. However, no matter how interested and occupied others may be at times, he seldom comes and participates, despite being cordially invited. However denies si, hi, a/vH.

## 2019-06-18 PROCEDURE — 6370000000 HC RX 637 (ALT 250 FOR IP): Performed by: NURSE PRACTITIONER

## 2019-06-18 PROCEDURE — 99231 SBSQ HOSP IP/OBS SF/LOW 25: CPT | Performed by: NURSE PRACTITIONER

## 2019-06-18 PROCEDURE — 1240000000 HC EMOTIONAL WELLNESS R&B

## 2019-06-18 PROCEDURE — 6370000000 HC RX 637 (ALT 250 FOR IP): Performed by: PSYCHIATRY & NEUROLOGY

## 2019-06-18 RX ORDER — VALPROIC ACID 250 MG/5ML
250 SOLUTION ORAL 3 TIMES DAILY
Status: DISCONTINUED | OUTPATIENT
Start: 2019-06-18 | End: 2019-06-22 | Stop reason: HOSPADM

## 2019-06-18 RX ADMIN — DOCUSATE SODIUM 100 MG: 100 CAPSULE, LIQUID FILLED ORAL at 10:29

## 2019-06-18 RX ADMIN — VALPROIC ACID 250 MG: 250 SOLUTION ORAL at 14:01

## 2019-06-18 RX ADMIN — VALPROIC ACID 250 MG: 250 SOLUTION ORAL at 20:49

## 2019-06-18 RX ADMIN — VALPROIC ACID 250 MG: 250 SOLUTION ORAL at 10:28

## 2019-06-18 RX ADMIN — PALIPERIDONE 6 MG: 6 TABLET, EXTENDED RELEASE ORAL at 10:29

## 2019-06-18 ASSESSMENT — PAIN SCALES - GENERAL
PAINLEVEL_OUTOF10: 0

## 2019-06-18 NOTE — PLAN OF CARE
Pt denies SI, HI, and AVH. Pt's responses are short and vague. Pt is ambulating with steady gait and wheeled walker.      Problem: Altered Mood, Psychotic Behavior:  Goal: Able to demonstrate trust by eating, participating in treatment and following staff's direction  Description  Able to demonstrate trust by eating, participating in treatment and following staff's direction  Outcome: Ongoing     Problem: Altered Mood, Psychotic Behavior:  Goal: Able to verbalize reality based thinking  Description  Able to verbalize reality based thinking  Outcome: Ongoing

## 2019-06-18 NOTE — PROGRESS NOTES
Nutrition Assessment    Type and Reason for Visit: Reassess    Nutrition Recommendations: Continue current diet, Continue current ONS    Nutrition Assessment: Pt improving from a nutrition standpoint, now consuming > 75% of meals/ ONS. Continue diet/ONS BID. Offered to weigh pt, pt not interested. Malnutrition Assessment:  · Malnutrition Status: Meets the criteria for moderate malnutrition  · Context: Acute illness or injury  · Findings of the 6 clinical characteristics of malnutrition (Minimum of 2 out of 6 clinical characteristics is required to make the diagnosis of moderate or severe Protein Calorie Malnutrition based on AND/ASPEN Guidelines):  1. Energy Intake-Greater than 75% of estimated energy requirement, (x 2 days)    2. Weight Loss-10% loss or greater, in 1 month  3. Fat Loss-Mild subcutaneous fat loss, Orbital  4. Muscle Loss-Mild muscle mass loss, Temples (temporalis muscle)  5. Fluid Accumulation-No significant fluid accumulation,    6.  Strength-Not measured    Nutrition Risk Level: Moderate    Nutrient Needs:  · Estimated Daily Total Kcal: 8460-1443(CBW REE 1371 x 1.2 SF)  · Estimated Daily Protein (g): 70-85(CBW 1.2 - 1.4 g/kg)  · Estimated Daily Total Fluid (ml/day): 7061-7558(1ml / kcal)    Nutrition Diagnosis:   · Problem:  Moderate malnutrition, In context of acute illness or injury  · Etiology: related to Cognitive or neurological impairment     Signs and symptoms:  as evidenced by Diet history of poor intake, Weight loss greater than or equal to 5% in 1 month, Mild loss of subcutaneous fat, Mild muscle loss    Objective Information:  · Nutrition-Focused Physical Findings: alert, appetite improved, anxious, no edema noted, + I/O, abdomen WDL, + BS   · Wound Type: Open Wounds  · Current Nutrition Therapies:  · Oral Diet Orders: General   · Oral Diet intake: %  · Oral Nutrition Supplement (ONS) Orders: Standard High Calorie Oral Supplement  · ONS intake: 51-75%  · Anthropometric

## 2019-06-19 PROCEDURE — 1240000000 HC EMOTIONAL WELLNESS R&B

## 2019-06-19 PROCEDURE — 6370000000 HC RX 637 (ALT 250 FOR IP): Performed by: PSYCHIATRY & NEUROLOGY

## 2019-06-19 PROCEDURE — 99231 SBSQ HOSP IP/OBS SF/LOW 25: CPT | Performed by: NURSE PRACTITIONER

## 2019-06-19 PROCEDURE — 6370000000 HC RX 637 (ALT 250 FOR IP): Performed by: NURSE PRACTITIONER

## 2019-06-19 RX ORDER — PALIPERIDONE 6 MG/1
6 TABLET, EXTENDED RELEASE ORAL DAILY
Status: DISCONTINUED | OUTPATIENT
Start: 2019-06-20 | End: 2019-06-20

## 2019-06-19 RX ADMIN — DOCUSATE SODIUM 100 MG: 100 CAPSULE, LIQUID FILLED ORAL at 09:34

## 2019-06-19 RX ADMIN — VALPROIC ACID 250 MG: 250 SOLUTION ORAL at 20:28

## 2019-06-19 RX ADMIN — LORAZEPAM 0.5 MG: 0.5 TABLET ORAL at 11:24

## 2019-06-19 RX ADMIN — VALPROIC ACID 250 MG: 250 SOLUTION ORAL at 09:34

## 2019-06-19 RX ADMIN — VALPROIC ACID 250 MG: 250 SOLUTION ORAL at 13:03

## 2019-06-19 RX ADMIN — PALIPERIDONE 6 MG: 6 TABLET, EXTENDED RELEASE ORAL at 09:34

## 2019-06-19 ASSESSMENT — PAIN SCALES - GENERAL
PAINLEVEL_OUTOF10: 0

## 2019-06-19 NOTE — PLAN OF CARE
Patient pleasant and cooperative. Denies SI,HI, and hallucinations although patient responds with vague answers and is evasive. Patient frequently answers by stating \"I don't know maybe\" or \"yeah probably\" to most questions answered. Patient admitted to feeling paranoid and nervous. States he feels like someone might hurt him. Patient out on unit but minimally interactive with peers. Patient appears confused at times.  Will continue to observe and support  Problem: Altered Mood, Psychotic Behavior:  Goal: Absence of self-harm  Description  Absence of self-harm  Outcome: Met This Shift     Problem: Altered Mood, Psychotic Behavior:  Goal: Able to verbalize reality based thinking  Description  Able to verbalize reality based thinking  Outcome: Not Met This Shift

## 2019-06-19 NOTE — PLAN OF CARE
Pt alert and oriented to self, place and aware it's year 2019. Out on unit briefly to eat snack then returned back to bed. Ambulating with wheeled walker and aware of fall precautions. Pt denies SI, HI and hallucinations and states \"god told me to answer you\". Close observations continue.   Problem: Altered Mood, Psychotic Behavior:  Goal: Able to verbalize reality based thinking  Description  Able to verbalize reality based thinking  6/18/2019 2215 by Scot Noel  Outcome: Met This Shift     Problem: Altered Mood, Psychotic Behavior:  Goal: Ability to interact with others will improve  Description  Ability to interact with others will improve  Outcome: Met This Shift     Problem: Falls - Risk of:  Goal: Will remain free from falls  Description  Will remain free from falls  Outcome: Met This Shift     Problem: Falls - Risk of:  Goal: Absence of physical injury  Description  Absence of physical injury  Outcome: Met This Shift

## 2019-06-19 NOTE — GROUP NOTE
Group Therapy Note    Date: June 19    Group Start Time: 1726  Group End Time: 1700  Group Topic: Healthy Living/Wellness: Building Relationships     [facility] [address] [address] Malinda Rasmussen RN        Group Therapy Note    Attendees: 8/12     Lorenza Armenta RN

## 2019-06-19 NOTE — PLAN OF CARE
[facility] Interdisciplinary Treatment Plan Note     Review Date & Time: 6/19/19 1030    Patient was in treatment team.    Admission Type:   Admission Type: Involuntary    Reason for admission:  Reason for Admission: Because my wife brought me here (put me) here because that way I was talk. Wife states that patient had been acting odd & not eating well. So she took him to [facility] and patient was prescribed zoloft. She states that he has been taking for past couple weeks and eating then today he became terriblely odd talking out of his head was going to take him to [facility] but he wouldn't get in carso called ambulance and was brought here. Estimated Length of Stay Update:  15-17 days   Estimated Discharge Date Update: 6/22/19    PATIENT STRENGTHS:  Patient Strengths:Strengths: Positive Support, Medication Compliance  Patient Strengths and Limitations:Limitations: Tendency to isolate self, Multiple barriers to leisure interests, Difficulty problem solving/relies on others to help solve problems  Addictive Behavior:Addictive Behavior  In the past 3 months, have you felt or has someone told you that you have a problem with:  : None  Do you have a history of Chemical Use?: No  Do you have a history of Alcohol Use?: No  Do you have a history of Street Drug Abuse?: No  Histroy of Prescripton Drug Abuse?: No  Medical Problems:   Past Medical History:   Diagnosis Date    DVT of lower extremity (deep venous thrombosis) ([facility] [address]     left    Influenza 02/2019       Risk:  Fall RiskTotal: 101  Stephon Scale Stephon Scale Score: 20  BVC Total: 0  Change in scores no.  Changes to plan of Care  no    Status EXAM:   Status and Exam  Normal: No  Facial Expression: Worried  Affect: Blunt  Level of Consciousness: Alert  Mood:Normal: No  Mood: Anxious, Suspicious  Motor Activity:Normal: No  Motor Activity: Decreased  Interview Behavior: Evasive  Preception: [place] to Person, [place] to Place, [place] to Time  Attention:Normal: No  Attention: Unable to Concentrate  Thought Processes: Circumstantial  Thought Content:Normal: No  Thought Content: Poverty of Content  Hallucinations: None  Delusions: Yes  Delusions: Persecution  Memory:Normal: No  Memory: Poor Recent, Poor Remote  Insight and Judgment: No  Insight and Judgment: Poor Judgment, Poor Insight  Present Suicidal Ideation: No  Present Homicidal Ideation: No    Daily Assessment Last Entry:   Daily Sleep (WDL): Within Defined Limits         Patient Currently in Pain: Other (comment)(Patient Sleeping)  Daily Nutrition (WDL): Within Defined Limits    Patient Monitoring:  Frequency of Checks: 4 times per hour, close    Psychiatric Symptoms:   Depression Symptoms  Depression Symptoms: Isolative  Anxiety Symptoms  Anxiety Symptoms: Generalized  Aleta Symptoms  Aleta Symptoms: No problems reported or observed. Psychosis Symptoms  Hallucination Type: No problems reported or observed. Delusion Type: Paranoid    Suicide Risk CSSR-S:  1) Within the past month, have you wished you were dead or wished you could go to sleep and not wake up? : Yes  2) Have you actually had any thoughts of killing yourself? : Yes  3) Have you been thinking about how you might kill yourself? : Yes  5) Have you started to work out or worked out the details of how to kill yourself?  Do you intend to carry out this plan? : No  6) Have you ever done anything, started to do anything, or prepared to do anything to end your life?: No  Change in Result no Change in Plan of care no    EDUCATION:   Learner Progress Toward Treatment Goals: Reviewed goals and plan of care    Method: Small group    Outcome: Verbalized understanding    PATIENT GOALS: patient stated his goal was \"go home\"    PLAN/TREATMENT RECOMMENDATIONS UPDATE: encourage group attendance and provide emotional support    GOALS UPDATE:  Time frame for Short-Term Goals: 1-3 days      Darryle Clutter, RN 84F w aortic stenosis (s/p AVR), CAD (s/p CABG and multiple stents), HTN, HLD, CKD, 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 fmaily noticed it when she was discharged, has baseline weakness but has gotten worse to the point she is dragging her feet, no headache, no UE weakness that's new, no vision changes, no chagnes in her speech (has baseline slurred speech), no urinary incontinence that's changed, no back pain/injuries  ROS: No fever/chills, No photophobia/eye pain/changes in vision, No ear pain/sore throat/dysphagia, No chest pain/palpitations, no SOB/cough/wheeze/stridor, No abdominal pain/N/V/D, no dysuria/frequency/discharge, No neck/back pain, no rash,

## 2019-06-19 NOTE — PROGRESS NOTES
DATE OF SERVICE:     6/19/2019    Luis Martinez seen today for the purpose of continuation of care. Nursing, social work reports, laboratory studies and vital signs are reviewed. Patient chief complaint today is:             [x] Depression      [] Anxiety        [] Psychosis         [x] Suicidal/Homicidal                         [x] Delusions           [] Aggression          Subjective:     Patient states he is having AVH still and admits to worrying frequently. Patient denies SI currently. Patient remains on 2 hour lockout. Patient is medication compliant. Sleep:  [] Good [x] Fair  [] Poor  Appetite:  [] Good [x] Fair  [] Poor    Depression:  [] Mild [] Moderate [x] Severe                [x] Constant [] Sporadic     Anxiety: [] Mild [] Moderate [] Severe    [] Constant [] Sporadic     Delusions: [] Mild [x] Moderate [] Severe     [] Constant [x] Sporadic     [x] Paranoid [] Somatic [] Grandiose     Hallucinations: [] Mild [] Moderate [] Severe     [] Constant [] Sporadic    [] Auditory  [] Visual [] Tactile       Suicidal: [] Constant [x] Sporadic  Homicidal: [] Constant [] Sporadic    Unscheduled Medications     [] Patient Receiving Emergency Medications \" Chemical Restraint\"   [] Requesting PRN medications for anxiety    Psychiatric Review of systems  Delusions:  [] Denies [] Endorses   Withdrawals:  [] Denies [] Endorses    Hallucinations: [] Denies [] Endorses    Extra Pyramidal Symptoms: [] Denies [] Endorses      Medical Review of Systems:     All other than marked systmes have been reviewed and are all negative.     Constitutional Symptoms: []  fever []  Chills  Skin Symptoms: [] rash []  Pruritus   Eye Symptoms: [] Vision unchanged []  recent vision problems[] blurred vision   Respiratory Symptoms:[] shortness of breath [] cough  Cardiovascular Symptoms:  [] chest pain   [] palpitations   Gastrointestinal Symptoms: []  abdominal pain []  nausea []  vomiting []  diarrhea  Genitourinary [] Intact  [] Fair  [x] Limited    Judgement:  [] Intact  [] Fair  [x] Limited      Assessment/Plan:        Patient Active Problem List   Diagnosis Code    Esophageal perforation K22.3    Schizophrenia, paranoid type ([facility] [address] F20.0    Moderate protein-energy malnutrition ([facility] [address] E44.0         Plan:    []  Patient is refusing medications  [x] Improving as expected   [] Not improving as expected   [] Worsening    []  At Baseline      Will continue current medications     Reason for more than one antipsychotic:  [x] N/A  [] 3 failed monotherapy(drugs tried):  [] Cross over to a new antipsychotic  [] Taper to monotherapy from polypharmacy  [] Augmentation of Clozapine therapy due to treatment resistance to single therapy      Signed:  Wanda Amaro  6/19/2019  1:12 PM

## 2019-06-19 NOTE — PROGRESS NOTES
positive orthostatics noted and given pitcher of water to drink and has rajendra hose on and reminded to keep walker with him at all times.

## 2019-06-19 NOTE — PLAN OF CARE
Problem: Altered Mood, Psychotic Behavior:  Goal: Able to verbalize decrease in frequency and intensity of hallucinations  Description  Able to verbalize decrease in frequency and intensity of hallucinations  Outcome: Met This Shift     Problem: Altered Mood, Psychotic Behavior:  Goal: Able to verbalize reality based thinking  Description  Able to verbalize reality based thinking  6/18/2019 2316 by Zenaida Velasquez RN  Outcome: Met This Shift     Problem: Altered Mood, Psychotic Behavior:  Goal: Able to verbalize reality based thinking  Description  Able to verbalize reality based thinking  6/18/2019 1217 by Elyssa Torre RN  Outcome: Ongoing     Problem: Altered Mood, Psychotic Behavior:  Goal: Absence of self-harm  Description  Absence of self-harm  Outcome: Met This Shift     Problem: Altered Mood, Psychotic Behavior:  Goal: Ability to achieve adequate nutritional intake will improve  Description  Ability to achieve adequate nutritional intake will improve  Outcome: Met This Shift     Problem: Altered Mood, Psychotic Behavior:  Goal: Compliance with prescribed medication regimen will improve  Description  Compliance with prescribed medication regimen will improve  Outcome: Met This Shift

## 2019-06-20 PROCEDURE — 1240000000 HC EMOTIONAL WELLNESS R&B

## 2019-06-20 PROCEDURE — 6370000000 HC RX 637 (ALT 250 FOR IP): Performed by: NURSE PRACTITIONER

## 2019-06-20 PROCEDURE — 99231 SBSQ HOSP IP/OBS SF/LOW 25: CPT | Performed by: NURSE PRACTITIONER

## 2019-06-20 PROCEDURE — 6370000000 HC RX 637 (ALT 250 FOR IP): Performed by: PSYCHIATRY & NEUROLOGY

## 2019-06-20 RX ADMIN — VALPROIC ACID 250 MG: 250 SOLUTION ORAL at 13:27

## 2019-06-20 RX ADMIN — VALPROIC ACID 250 MG: 250 SOLUTION ORAL at 20:46

## 2019-06-20 RX ADMIN — PALIPERIDONE 6 MG: 6 TABLET, EXTENDED RELEASE ORAL at 09:45

## 2019-06-20 RX ADMIN — DOCUSATE SODIUM 100 MG: 100 CAPSULE, LIQUID FILLED ORAL at 09:44

## 2019-06-20 RX ADMIN — VALPROIC ACID 250 MG: 250 SOLUTION ORAL at 09:44

## 2019-06-20 ASSESSMENT — PAIN SCALES - GENERAL
PAINLEVEL_OUTOF10: 0
PAINLEVEL_OUTOF10: 0

## 2019-06-20 NOTE — GROUP NOTE
Group Therapy Note    Date: June 20    Group Start Time: 4208  Group End Time: 3657  Group Topic: Healthy Living/Wellness    [facility]          Group Therapy Note    Attendees: 14         Discussed exercise and healthy living       Signature:  Nneka Steinberg RN

## 2019-06-20 NOTE — PLAN OF CARE
Pt resting in bed with eyes closed, no observed abnormalities. Close observations continue. Bed alarm on.   Problem: Altered Mood, Psychotic Behavior:  Goal: Able to verbalize reality based thinking  Description  Able to verbalize reality based thinking  6/19/2019 2223 by Jack Norton  Outcome: Met This Shift     Problem: Altered Mood, Psychotic Behavior:  Goal: Absence of self-harm  Description  Absence of self-harm  6/20/2019 0255 by Jack Norton  Outcome: Met This Shift     Problem: Falls - Risk of:  Goal: Will remain free from falls  Description  Will remain free from falls  6/20/2019 0255 by Jack Norton  Outcome: Met This Shift

## 2019-06-20 NOTE — PROGRESS NOTES
DATE OF SERVICE:     6/20/2019    Terri Patton seen today for the purpose of continuation of care. Nursing, social work reports, laboratory studies and vital signs are reviewed. Patient chief complaint today is:             [x] Depression      [] Anxiety        [] Psychosis         [x] Suicidal/Homicidal                         [x] Delusions           [] Aggression          Subjective:     Patient states he is doing better today, appears brighter. Patient is medication compliant. Sleep:  [] Good [x] Fair  [] Poor  Appetite:  [] Good [x] Fair  [] Poor    Depression:  [] Mild [] Moderate [x] Severe                [x] Constant [] Sporadic     Anxiety: [] Mild [] Moderate [] Severe    [] Constant [] Sporadic     Delusions: [] Mild [x] Moderate [] Severe     [] Constant [x] Sporadic     [x] Paranoid [] Somatic [] Grandiose     Hallucinations: [] Mild [] Moderate [] Severe     [] Constant [] Sporadic    [] Auditory  [] Visual [] Tactile       Suicidal: [] Constant [] Sporadic  Homicidal: [] Constant [] Sporadic    Unscheduled Medications     [] Patient Receiving Emergency Medications \" Chemical Restraint\"   [] Requesting PRN medications for anxiety    Psychiatric Review of systems  Delusions:  [] Denies [] Endorses   Withdrawals:  [] Denies [] Endorses    Hallucinations: [] Denies [] Endorses    Extra Pyramidal Symptoms: [] Denies [] Endorses      Medical Review of Systems:     All other than marked systmes have been reviewed and are all negative.     Constitutional Symptoms: []  fever []  Chills  Skin Symptoms: [] rash []  Pruritus   Eye Symptoms: [] Vision unchanged []  recent vision problems[] blurred vision   Respiratory Symptoms:[] shortness of breath [] cough  Cardiovascular Symptoms:  [] chest pain   [] palpitations   Gastrointestinal Symptoms: []  abdominal pain []  nausea []  vomiting []  diarrhea  Genitourinary Symptoms: []  dysuria  []  hematuria   Musculoskeletal Symptoms: []  back pain [] Limited      Assessment/Plan:        Patient Active Problem List   Diagnosis Code    Esophageal perforation K22.3    Schizophrenia, paranoid type ([facility] [address] F20.0    Moderate protein-energy malnutrition ([facility] [address] E44.0         Plan:    []  Patient is refusing medications  [x] Improving as expected   [] Not improving as expected   [] Worsening    []  At Baseline      Will increase Invega 9 mg daily    Reason for more than one antipsychotic:  [x] N/A  [] 3 failed monotherapy(drugs tried):  [] Cross over to a new antipsychotic  [] Taper to monotherapy from polypharmacy  [] Augmentation of Clozapine therapy due to treatment resistance to single therapy      Signed:  Sofía Ruffin  6/20/2019  1:10 PM

## 2019-06-20 NOTE — GROUP NOTE
Patient attended goals group and stated daily goal as to get a comb for my hair. Group Therapy Note    Date: June 20    Group Start Time: 1015  Group End Time: 8947  Group Topic: Psychoeducation    [facility]    NIKUNJ Fraire    Number of participants:10  Type of group: Psychoeducation  Mode of intervention: Education, Support, Socialization, Exploration, Clarifying and Problem-solving  Topic: Mindfulness  Objective: To better understand how mindfulness impacts wellness. Group Therapy Note         Notes: Attended group and was able to participate in discussion. Status After Intervention:  Unchanged    Participation Level:  Active Listener, Interactive and Minimal    Participation Quality: Attentive and Sharing      Speech:  hesitant      Thought Process/Content: Logical      Affective Functioning: Flat      Mood: anxious and depressed      Level of consciousness:  Alert      Response to Learning: Progressing to goal      Endings: None Reported    Modes of Intervention: Education      Discipline Responsible: Psychoeducational Specialist      Signature:  NIKUNJ Ndiaye

## 2019-06-20 NOTE — GROUP NOTE
Group Therapy Note    Date: June 19    Group Start Time: 1945  Group End Time: 2045  Group Topic: Relaxation    [facility]    Stefanie Pathak RN; Reece Hernandez; Mark Forman RN; Aroldo De Anda LPN        Group Therapy Note    Attendees: 11    Participated in relaxation by watching television and socializing with other patients.            Signature:  Stefanie Pathak RN

## 2019-06-20 NOTE — PROGRESS NOTES
[facility] Interdisciplinary Treatment Plan Note     Review Date & Time: 6/20/2019 1030    Patient was not in treatment team.    Admission Type:   Admission Type: Involuntary    Reason for admission:  Reason for Admission: Because my wife brought me here (put me) here because that way I was talk. Wife states that patient had been acting odd & not eating well. So she took him to [facility] and patient was prescribed zoloft. She states that he has been taking for past couple weeks and eating then today he became terriblely odd talking out of his head was going to take him to [facility] but he wouldn't get in carso called ambulance and was brought here.     Estimated Length of Stay Update:  5-7 days   Estimated Discharge Date Update: 6/27/2019    PATIENT STRENGTHS:  Patient Strengths:Strengths: Positive Support, Medication Compliance  Patient Strengths and Limitations:Limitations: Tendency to isolate self, Multiple barriers to leisure interests, Difficulty problem solving/relies on others to help solve problems  Addictive Behavior:Addictive Behavior  In the past 3 months, have you felt or has someone told you that you have a problem with:  : None  Do you have a history of Chemical Use?: No  Do you have a history of Alcohol Use?: No  Do you have a history of Street Drug Abuse?: No  Histroy of Prescripton Drug Abuse?: No  Medical Problems:   Past Medical History:   Diagnosis Date    DVT of lower extremity (deep venous thrombosis) ([facility] [address]     left    Influenza 02/2019       Risk:  Fall RiskTotal: 85  Stephon Scale Stephon Scale Score: 20  BVC Total: 0      Status EXAM:   Status and Exam  Normal: No  Facial Expression: Brightened  Affect: Appropriate  Level of Consciousness: Alert  Mood:Normal: Yes  Mood: Anxious, Suspicious  Motor Activity:Normal: Yes  Motor Activity: Decreased  Interview Behavior: Cooperative  Preception: [place] to Person, [place] to Time, [place] to Place, [place] to Situation  Attention:Normal: Yes  Attention: Unable to Concentrate  Thought Processes: Circumstantial  Thought Content:Normal: Yes  Thought Content: Poverty of Content  Hallucinations: None  Delusions: No  Delusions: Persecution  Memory:Normal: Yes  Memory: Poor Recent, Poor Remote  Insight and Judgment: No  Insight and Judgment: Poor Insight  Present Suicidal Ideation: No  Present Homicidal Ideation: No    Daily Assessment Last Entry:   Daily Sleep (WDL): Within Defined Limits         Patient Currently in Pain: Denies  Daily Nutrition (WDL): Within Defined Limits    Patient Monitoring:  Frequency of Checks: 4 times per hour, close    Psychiatric Symptoms:   Depression Symptoms  Depression Symptoms: Isolative  Anxiety Symptoms  Anxiety Symptoms: Generalized  Aleta Symptoms  Aleta Symptoms: No problems reported or observed. Psychosis Symptoms  Hallucination Type: No problems reported or observed. Delusion Type: No problems reported or observed. Suicide Risk CSSR-S:  1) Within the past month, have you wished you were dead or wished you could go to sleep and not wake up? : Yes  2) Have you actually had any thoughts of killing yourself? : Yes  3) Have you been thinking about how you might kill yourself? : Yes  5) Have you started to work out or worked out the details of how to kill yourself?  Do you intend to carry out this plan? : No  6) Have you ever done anything, started to do anything, or prepared to do anything to end your life?: No      EDUCATION:   Learner Progress Toward Treatment Goals: Reviewed results and recommendations of this team and Reviewed goals and plan of care    Method: Small group    Outcome: Needs reinforcement    PATIENT GOALS: get a comb    PLAN/TREATMENT RECOMMENDATIONS UPDATE: Continue to support pateint in obtaining short term and long term goals whole on the unit    GOALS UPDATE:  Time frame for Short-Term Goals: 1-3  days      Carlo Andrade RN

## 2019-06-20 NOTE — PROGRESS NOTES
Alert and oriented this am able to following conversation. Patient with brighten speech and facial features. Discussed that he would like to get out and ride a motorcycle he also discussed the need to start exercising. He was riding the pedal bike this morning. Appears to have more energy and focused thought content. Has been social with other patient and staff.

## 2019-06-21 PROCEDURE — 6370000000 HC RX 637 (ALT 250 FOR IP): Performed by: NURSE PRACTITIONER

## 2019-06-21 PROCEDURE — 1240000000 HC EMOTIONAL WELLNESS R&B

## 2019-06-21 PROCEDURE — 6370000000 HC RX 637 (ALT 250 FOR IP): Performed by: PSYCHIATRY & NEUROLOGY

## 2019-06-21 PROCEDURE — 99231 SBSQ HOSP IP/OBS SF/LOW 25: CPT | Performed by: NURSE PRACTITIONER

## 2019-06-21 RX ADMIN — VALPROIC ACID 250 MG: 250 SOLUTION ORAL at 09:48

## 2019-06-21 RX ADMIN — DOCUSATE SODIUM 100 MG: 100 CAPSULE, LIQUID FILLED ORAL at 09:48

## 2019-06-21 RX ADMIN — VALPROIC ACID 250 MG: 250 SOLUTION ORAL at 13:36

## 2019-06-21 RX ADMIN — VALPROIC ACID 250 MG: 250 SOLUTION ORAL at 20:36

## 2019-06-21 RX ADMIN — PALIPERIDONE 9 MG: 6 TABLET, EXTENDED RELEASE ORAL at 09:48

## 2019-06-21 ASSESSMENT — PAIN SCALES - GENERAL
PAINLEVEL_OUTOF10: 0
PAINLEVEL_OUTOF10: 0

## 2019-06-21 NOTE — CARE COORDINATION
received call from pt's wife , who wanted pt to follow up with [facility] ( this is closer)    Wife was concern that casey was very pricey and she could not afford  This medication    Wife report pt has improved he not prefect but he is much imporved

## 2019-06-21 NOTE — PROGRESS NOTES
Attended morning goals group. Identified daily goal as \" Not be picked on today\". Attended discussion on wellness, but declined to verbally share, nor willing to plant a flower. Observed to be guarded and isolative from peers/students.

## 2019-06-21 NOTE — PROGRESS NOTES
DATE OF SERVICE:     6/21/2019    Jumana Florentino seen today for the purpose of continuation of care. Nursing, social work reports, laboratory studies and vital signs are reviewed. Patient chief complaint today is:             [x] Depression      [] Anxiety        [] Psychosis         [] Suicidal/Homicidal                         [x] Delusions           [] Aggression          Subjective:     Patient states he is [place]. \"  Patient is continuing to show improvement. Patient is medication compliant. Sleep:  [] Good [x] Fair  [] Poor  Appetite:  [] Good [x] Fair  [] Poor    Depression:  [] Mild [] Moderate [x] Severe                [x] Constant [] Sporadic     Anxiety: [] Mild [] Moderate [] Severe    [] Constant [] Sporadic     Delusions: [] Mild [x] Moderate [] Severe     [] Constant [x] Sporadic     [x] Paranoid [] Somatic [] Grandiose     Hallucinations: [] Mild [] Moderate [] Severe     [] Constant [] Sporadic    [] Auditory  [] Visual [] Tactile       Suicidal: [] Constant [] Sporadic  Homicidal: [] Constant [] Sporadic    Unscheduled Medications     [] Patient Receiving Emergency Medications \" Chemical Restraint\"   [] Requesting PRN medications for anxiety    Psychiatric Review of systems  Delusions:  [] Denies [] Endorses   Withdrawals:  [] Denies [] Endorses    Hallucinations: [] Denies [] Endorses    Extra Pyramidal Symptoms: [] Denies [] Endorses      Medical Review of Systems:     All other than marked systmes have been reviewed and are all negative.     Constitutional Symptoms: []  fever []  Chills  Skin Symptoms: [] rash []  Pruritus   Eye Symptoms: [] Vision unchanged []  recent vision problems[] blurred vision   Respiratory Symptoms:[] shortness of breath [] cough  Cardiovascular Symptoms:  [] chest pain   [] palpitations   Gastrointestinal Symptoms: []  abdominal pain []  nausea []  vomiting []  diarrhea  Genitourinary Symptoms: []  dysuria  []  hematuria   Musculoskeletal Symptoms: []  back pain []  muscle pain []  joint pain  Neurologic Symptoms: []  headache []  dizziness  Hematolymphoid Symptoms: [] Adenopathy [] Bruises   [] Schimosis       Mental Status Examination:    Cognition:      [x] Alert  [x] Awake  [x] Oriented  [x] Person  [x] Place [] Time      [] drowsy  [] tired  [] lethargic  [] distractable  [] Other     Attention/Concentration:   [x] Attentive  [] Distracted        Memory Recent and Remote: [x] Intact   [] Impaired [] Partially Impaired     Language: [] Able to recognize and name objects          [] Unable to recognize and name Objects    Fund of Knowledge:  [] Poor [x]  Fair  [] Good    Speech: [] Normal  [x] Soft  [x] Slow  [] Fast [] Pressured             [] Loud [] Dysarthria  [] Incoherent    Appearance: [] Well Groomed  [x] Casual Dressed  [] Unkept  [] Disheveled          [] Normal weight[x] Thin  [] Overweight  [] Obese           Attitude: [] Positive  [] Hostile  [] Demanding  [] Guarded  [] Defensive         [x] Cooperative  []  Uncooperative       Behavior:  [] Normal Gait  [] Walks with Assistance  [] Yudith Chair    [x] Walks with Maryann Sam  [] In Hospital Bed  [] Sitting in Chair    Muscle-Skeletal:  [x] Normal Muscle Tone [] Muscle Atrophy       [] Abnormal Muscle Movement     Eye Contact:  [] Good eye contact  [x] Intermittent Eye Contact  [] Poor Eye Contact     Mood: [x] Depressed  [] Anxious  [] Irritated  [] Euthymic   [] Angry [] Restless    Affect:  [x] Congruent  [] Incongruent  [] Labile  [] Constricted  [] Flat  [] Bizarre     Thought Process and Association:  [] Logical [] Illogical       [] Linear and Goal Directed  [] Tangential  [x] Circumstantial     Thought Content:  [] Denies [] Endorses [] Suicidal [] Homicidal  [x] Delusional      [x] Paranoid  [] Roman Catholic  [] Grandiose    Perception: [x]  None  [] Auditory   [] Visual  [] tactile   [] olfactory  [] Illusions         Insight: [] Intact  [] Fair  [x] Limited    Judgement:  [] Intact  [] Fair  [x]

## 2019-06-21 NOTE — GROUP NOTE
Group Therapy Note    Date: June 21    Group Start Time: 1600  Group End Time: 1630  Group Topic: Healthy Living/Wellness    [facility]  [address], LPN        Group Therapy Note    Attendees: 4         Patient attended group and participated.         Signature:  José Lott LPN

## 2019-06-21 NOTE — PLAN OF CARE
Pt awake, out on unit. Minimal socialization with peers. Reports anxiety and making vocalizations with his mouth. Pt states the vocalizations are d/t his anxiety. States \"everything is going wrong\", Would not elaborate further. Refused PRN's for anxiety. Denies SI, HI and hallucinations.    Problem: Altered Mood, Psychotic Behavior:  Goal: Able to verbalize decrease in frequency and intensity of hallucinations  Description  Able to verbalize decrease in frequency and intensity of hallucinations  Outcome: Met This Shift     Problem: Altered Mood, Psychotic Behavior:  Goal: Able to verbalize reality based thinking  Description  Able to verbalize reality based thinking  6/20/2019 2158 by [place]  Outcome: Met This Shift     Problem: Falls - Risk of:  Goal: Absence of physical injury  Description  Absence of physical injury  Outcome: Met This Shift

## 2019-06-22 VITALS
HEART RATE: 78 BPM | SYSTOLIC BLOOD PRESSURE: 105 MMHG | WEIGHT: 140 LBS | TEMPERATURE: 97.6 F | BODY MASS INDEX: 21.22 KG/M2 | DIASTOLIC BLOOD PRESSURE: 66 MMHG | RESPIRATION RATE: 16 BRPM | OXYGEN SATURATION: 97 % | HEIGHT: 68 IN

## 2019-06-22 LAB — VALPROIC ACID LEVEL: 30 MCG/ML (ref 50–100)

## 2019-06-22 PROCEDURE — 36415 COLL VENOUS BLD VENIPUNCTURE: CPT

## 2019-06-22 PROCEDURE — 99238 HOSP IP/OBS DSCHRG MGMT 30/<: CPT | Performed by: NURSE PRACTITIONER

## 2019-06-22 PROCEDURE — 6370000000 HC RX 637 (ALT 250 FOR IP): Performed by: NURSE PRACTITIONER

## 2019-06-22 PROCEDURE — 80164 ASSAY DIPROPYLACETIC ACD TOT: CPT

## 2019-06-22 PROCEDURE — 6370000000 HC RX 637 (ALT 250 FOR IP): Performed by: PSYCHIATRY & NEUROLOGY

## 2019-06-22 RX ORDER — VALPROIC ACID 250 MG/5ML
250 SOLUTION ORAL 3 TIMES DAILY
Qty: 450 ML | Refills: 0 | Status: SHIPPED | OUTPATIENT
Start: 2019-06-22 | End: 2019-07-22

## 2019-06-22 RX ORDER — PALIPERIDONE 9 MG/1
9 TABLET, EXTENDED RELEASE ORAL DAILY
Qty: 30 TABLET | Refills: 0 | Status: SHIPPED | OUTPATIENT
Start: 2019-06-23 | End: 2021-02-25 | Stop reason: ALTCHOICE

## 2019-06-22 RX ADMIN — PALIPERIDONE 9 MG: 6 TABLET, EXTENDED RELEASE ORAL at 09:29

## 2019-06-22 RX ADMIN — VALPROIC ACID 250 MG: 250 SOLUTION ORAL at 09:29

## 2019-06-22 RX ADMIN — DOCUSATE SODIUM 100 MG: 100 CAPSULE, LIQUID FILLED ORAL at 09:29

## 2019-06-22 RX ADMIN — VALPROIC ACID 250 MG: 250 SOLUTION ORAL at 13:41

## 2019-06-22 ASSESSMENT — PAIN SCALES - GENERAL: PAINLEVEL_OUTOF10: 0

## 2019-06-22 NOTE — PLAN OF CARE
Pt states \"the hallucinations kind of mellowed out with the medicine\". Pt denies SI, HI, AVH. Pt is pleasant, cooperative. Pt verbalized decrease in paranoia.      Problem: Altered Mood, Psychotic Behavior:  Goal: Able to verbalize decrease in frequency and intensity of hallucinations  Description  Able to verbalize decrease in frequency and intensity of hallucinations  Outcome: Met This Shift  Goal: Compliance with prescribed medication regimen will improve  Description  Compliance with prescribed medication regimen will improve  6/21/2019 2140 by Reina Cruz RN  Outcome: Met This Shift     Problem: Altered Mood, Psychotic Behavior:  Goal: Able to demonstrate trust by eating, participating in treatment and following staff's direction  Description  Able to demonstrate trust by eating, participating in treatment and following staff's direction  Outcome: Ongoing

## 2019-06-22 NOTE — PLAN OF CARE
Patient is resting quietly in bed with eyes closed at this time. No signs of distress or discomfort noted. No PRN medications given thus far. Safety needs met. No unit problems reported. Will continue to observe and support.      Problem: Altered Mood, Psychotic Behavior:  Goal: Able to verbalize reality based thinking  Description  Able to verbalize reality based thinking  Outcome: Ongoing  Note:   CE[facility] pt sleeping       Problem: Altered Mood, Psychotic Behavior:  Goal: Ability to interact with others will improve  Description  Ability to interact with others will improve  Outcome: Ongoing  Note:   CE[facility] pt sleeping

## 2019-06-22 NOTE — PROGRESS NOTES
Received call from pt's wife Ruben Perez, pt's script for Lai Blare was $480 and [facility] [facility] recommended switching to Risperdal. Communicated with SIDNEY GuillenP. OK to call-in Risperdal 2 mg BID. Joslyn requested scripts be sent to [facility], script for Risperdal 2 mg BID called in. Joslyn notified script was called in and should be available for the pt. All questions answered in detail to wife Ruben Perez.

## 2019-06-22 NOTE — PLAN OF CARE
Denies SI, HI, and hallucinations. Also denies anxiety and depression. Patient has been out on the unit this evening. Medications taken without issue. No PRNs administered. No complaints or concerns verbalized at this time. Will continue to monitor and offer support.         Problem: Altered Mood, Psychotic Behavior:  Goal: Compliance with prescribed medication regimen will improve  Description  Compliance with prescribed medication regimen will improve  Outcome: Met This Shift         Electronically signed by Rangel Funez RN on 6/21/2019 at 9:41 PM

## 2019-06-22 NOTE — CARE COORDINATION
Phone call to pts wife Lea Armstrong who will pick pt up after 12 pm. No further concerns. Nurse notified.

## 2019-06-22 NOTE — DISCHARGE SUMMARY
Bizarre     Thought Process and Association:  [] Logical [] Illogical       [x] Linear and Goal Directed  [] Tangential  [] Circumstantial     Thought Content:  [x] Denies [] Endorses [] Suicidal [] Homicidal  [] Delusional      [] Paranoid  [] Somatic  [] Grandiose    Perception: [x]  None  [] Auditory   [] Visual  [] tactile   [] olfactory  [] Illusions         Insight: [] Intact  [x] Fair  [] Limited    Judgement:  [] Intact  [x] Fair  [] Limited    Hospital Course:   Admit Date: 6/5/2019     Discharge Date: 6/22/2019  Admitted from:  [x]  Emergency Room  []  Home  []  Another facility   []  [facility]     Admitting diagnosis:   Patient Active Problem List   Diagnosis    Esophageal perforation    [facility] paranoid type [facility]    Moderate protein-energy malnutrition ([facility] [address]      Length of stay:  17 days              Pooja Galarza was admitted in Psychiatric unit  from ER with psychosis. Patient was treated            With the above . Patient responded well to the treatment. Discharge Summary Plan:     Discharge Status:    [x] Improved [] Unchanged    [] Worse       Discharge instructions given:  [x] Patient    [] Family [] Other         Discharge disposition:  [x] Home [] Step Down unit  [] Group Home []  [facility]                                                    [] [facility]    [] AMA  [] Other           Prescriptions: Continue same medications, review with patient.        Reason for more than one antipsychotic:  [x] N/A  [] 3 failed monotherapy(drugs tried):  [] Cross over to a new antipsychotic  [] Taper to monotherapy from polypharmacy  [] Augmentation of Clozapine therapy due to treatment resistance to single therapy      Diagnosis:        Patient Active Problem List   Diagnosis Code    Esophageal perforation K22.3    Schizophrenia, paranoid type ([facility] [address] [facility] F20.0    Moderate protein-energy malnutrition ([facility] E44.0       Education and Follow-up:  Counseled:  [x] Patient     [] Family    [] Guardian Signed:   Aleja Ly   6/22/2019   10:10 AM

## 2019-06-22 NOTE — GROUP NOTE
Group Therapy Note    Date: June 22    Group Start Time: 0945  Group End Time: 1030  Group Topic: Psychoeducation    [facility]  [id number]      Notes:  Patient reluctant to participate in beginning, however did join group and participate appropriately.

## 2020-03-04 ENCOUNTER — HOSPITAL ENCOUNTER (OUTPATIENT)
Age: 75
Discharge: HOME | End: 2020-03-04

## 2020-03-04 LAB
ALBUMIN SERPL-MCNC: 3.9 G/DL (ref 3.5–5.2)
ALP BLD-CCNC: 64 U/L (ref 40–129)
ALT SERPL-CCNC: 62 U/L (ref 0–40)
AST SERPL-CCNC: 47 U/L (ref 0–39)
BILIRUB SERPL-MCNC: 0.5 MG/DL (ref 0–1.2)
BILIRUBIN DIRECT: <0.2 MG/DL (ref 0–0.3)
BILIRUBIN, INDIRECT: ABNORMAL MG/DL (ref 0–1)
HCT VFR BLD CALC: 41.9 % (ref 37–54)
HEMOGLOBIN: 13.6 G/DL (ref 12.5–16.5)
MCH RBC QN AUTO: 29.8 PG (ref 26–35)
MCHC RBC AUTO-ENTMCNC: 32.5 % (ref 32–34.5)
MCV RBC AUTO: 91.9 FL (ref 80–99.9)
PDW BLD-RTO: 14.6 FL (ref 11.5–15)
PLATELET # BLD: 150 E9/L (ref 130–450)
PMV BLD AUTO: 11.6 FL (ref 7–12)
RBC # BLD: 4.56 E12/L (ref 3.8–5.8)
TOTAL PROTEIN: 7.2 G/DL (ref 6.4–8.3)
VALPROIC ACID LEVEL: 52 MCG/ML (ref 50–100)
WBC # BLD: 6.6 E9/L (ref 4.5–11.5)

## 2020-03-04 PROCEDURE — 80076 HEPATIC FUNCTION PANEL: CPT

## 2020-03-04 PROCEDURE — 36415 COLL VENOUS BLD VENIPUNCTURE: CPT

## 2020-03-04 PROCEDURE — 80164 ASSAY DIPROPYLACETIC ACD TOT: CPT

## 2020-03-04 PROCEDURE — 85027 COMPLETE CBC AUTOMATED: CPT

## 2020-03-11 ENCOUNTER — HOSPITAL ENCOUNTER (OUTPATIENT)
Age: 75
Discharge: HOME | End: 2020-03-11

## 2020-03-11 LAB
ALBUMIN SERPL-MCNC: 3.8 G/DL (ref 3.5–5.2)
ALP BLD-CCNC: 60 U/L (ref 40–129)
ALT SERPL-CCNC: 73 U/L (ref 0–40)
AST SERPL-CCNC: 56 U/L (ref 0–39)
BILIRUB SERPL-MCNC: 0.5 MG/DL (ref 0–1.2)
BILIRUBIN DIRECT: <0.2 MG/DL (ref 0–0.3)
BILIRUBIN, INDIRECT: ABNORMAL MG/DL (ref 0–1)
TOTAL PROTEIN: 6.9 G/DL (ref 6.4–8.3)

## 2020-03-11 PROCEDURE — 36415 COLL VENOUS BLD VENIPUNCTURE: CPT

## 2020-03-11 PROCEDURE — 80076 HEPATIC FUNCTION PANEL: CPT

## 2021-02-25 RX ORDER — OMEPRAZOLE 20 MG/1
20 CAPSULE, DELAYED RELEASE ORAL DAILY
COMMUNITY

## 2021-02-26 ENCOUNTER — HOSPITAL ENCOUNTER (OUTPATIENT)
Age: 76
Discharge: HOME | End: 2021-02-28
Payer: MEDICARE

## 2021-02-26 ENCOUNTER — ANESTHESIA EVENT (OUTPATIENT)
Dept: OPERATING ROOM | Age: 76
End: 2021-02-26
Payer: MEDICARE

## 2021-02-26 ENCOUNTER — PREP FOR PROCEDURE (OUTPATIENT)
Dept: OPHTHALMOLOGY | Age: 76
End: 2021-02-26

## 2021-02-26 PROCEDURE — U0003 INFECTIOUS AGENT DETECTION BY NUCLEIC ACID (DNA OR RNA); SEVERE ACUTE RESPIRATORY SYNDROME CORONAVIRUS 2 (SARS-COV-2) (CORONAVIRUS DISEASE [COVID-19]), AMPLIFIED PROBE TECHNIQUE, MAKING USE OF HIGH THROUGHPUT TECHNOLOGIES AS DESCRIBED BY CMS-2020-01-R: HCPCS

## 2021-02-26 RX ORDER — TOBRAMYCIN 3 MG/ML
1 SOLUTION/ DROPS OPHTHALMIC SEE ADMIN INSTRUCTIONS
Status: CANCELLED | OUTPATIENT
Start: 2021-03-01

## 2021-02-26 RX ORDER — SODIUM CHLORIDE 0.9 % (FLUSH) 0.9 %
10 SYRINGE (ML) INJECTION PRN
Status: CANCELLED | OUTPATIENT
Start: 2021-02-26

## 2021-02-26 RX ORDER — PROPARACAINE HYDROCHLORIDE 5 MG/ML
1 SOLUTION/ DROPS OPHTHALMIC SEE ADMIN INSTRUCTIONS
Status: CANCELLED | OUTPATIENT
Start: 2021-03-01

## 2021-02-26 RX ORDER — TROPICAMIDE 10 MG/ML
1 SOLUTION/ DROPS OPHTHALMIC SEE ADMIN INSTRUCTIONS
Status: CANCELLED | OUTPATIENT
Start: 2021-03-01

## 2021-02-26 RX ORDER — SODIUM CHLORIDE 0.9 % (FLUSH) 0.9 %
10 SYRINGE (ML) INJECTION EVERY 12 HOURS SCHEDULED
Status: CANCELLED | OUTPATIENT
Start: 2021-02-26

## 2021-02-26 RX ORDER — PHENYLEPHRINE HCL 2.5 %
1 DROPS OPHTHALMIC (EYE) SEE ADMIN INSTRUCTIONS
Status: CANCELLED | OUTPATIENT
Start: 2021-03-01

## 2021-02-26 NOTE — ANESTHESIA PRE PROCEDURE
Department of Anesthesiology  Preprocedure Note       Name:  Domenic Duncan   Age:  70 y.o.  :  1945                                          MRN:  [id number]         Date:  2021      Surgeon: Ayla Rouse):  Bhavki Faulkner MD    Procedure: Procedure(s):  PARS PLANA VITRECTOMY, REMOVAL OF RETAINED FRAGMENTS, 25G, MAC, RIGHT EYE    Medications prior to admission:   Prior to Admission medications    Medication Sig Start Date End Date Taking? Authorizing Provider   omeprazole (PRILOSEC) 20 MG delayed release capsule Take 20 mg by mouth daily   Yes Historical Provider, MD   valproic acid (DEPACON) 250 MG/5ML SOLN oral solution Take 5 mLs by mouth 3 times daily  Patient taking differently: Take 250 mg by mouth nightly  19  ELENA Putnam - CNP       Current medications:    No current facility-administered medications for this encounter.       Current Outpatient Medications   Medication Sig Dispense Refill    omeprazole (PRILOSEC) 20 MG delayed release capsule Take 20 mg by mouth daily      valproic acid (DEPACON) 250 MG/5ML SOLN oral solution Take 5 mLs by mouth 3 times daily (Patient taking differently: Take 250 mg by mouth nightly ) 450 mL 0       Allergies:  No Known Allergies    Problem List:    Patient Active Problem List   Diagnosis Code    Esophageal perforation K22.3    Schizophrenia, paranoid type ([address] F20.0    Moderate protein-energy malnutrition ([address] E44.0       Past Medical History:        Diagnosis Date    Depression     DVT of lower extremity (deep venous thrombosis) ([address]     left; : no blood thinners    GERD (gastroesophageal reflux disease)     Hx of blood clots     left leg ;  left arm from IV : no blood thinner    Influenza 2019       Past Surgical History:        Procedure Laterality Date    MOUTH SURGERY      poor historian:  \": bad infection in throat- 3 oral surgeries       Social History:    Social History     Tobacco Use  Smoking status: Never Smoker    Smokeless tobacco: Never Used   Substance Use Topics    Alcohol use: No                                Counseling given: Not Answered      Vital Signs (Current):   Vitals:    02/25/21 1436   Weight: 188 lb (85.3 kg)   Height: 5' 8\" (1.727 m)                                              BP Readings from Last 3 Encounters:   02/06/19 119/71       NPO Status:                                                                                 BMI:   Wt Readings from Last 3 Encounters:   02/25/21 188 lb (85.3 kg)   02/06/19 174 lb (78.9 kg)     Body mass index is 28.59 kg/m². CBC:   Lab Results   Component Value Date    WBC 6.6 03/04/2020    RBC 4.56 03/04/2020    HGB 13.6 03/04/2020    HCT 41.9 03/04/2020    MCV 91.9 03/04/2020    RDW 14.6 03/04/2020     03/04/2020       CMP:   Lab Results   Component Value Date     06/07/2019    K 4.0 06/07/2019    K 3.9 02/06/2019     06/07/2019    CO2 31 06/07/2019    BUN 18 06/07/2019    CREATININE 0.9 06/07/2019    GFRAA >60 06/07/2019    LABGLOM >60 06/07/2019    GLUCOSE 106 06/07/2019    GLUCOSE 96 05/11/2011    PROT 6.9 03/11/2020    CALCIUM 9.4 06/07/2019    BILITOT 0.5 03/11/2020    ALKPHOS 60 03/11/2020    AST 56 03/11/2020    ALT 73 03/11/2020       POC Tests: No results for input(s): POCGLU, POCNA, POCK, POCCL, POCBUN, POCHEMO, POCHCT in the last 72 hours.     Coags:   Lab Results   Component Value Date    PROTIME 13.0 02/06/2019    PROTIME 16.3 12/12/2011    INR 1.1 02/06/2019    APTT 25.8 02/06/2019       HCG (If Applicable): No results found for: PREGTESTUR, PREGSERUM, HCG, HCGQUANT     ABGs: No results found for: PHART, PO2ART, GVW2BAA, CBJ4TSL, BEART, C8HRNFLN     Type & Screen (If Applicable):  No results found for: LABABO, LABRH    Drug/Infectious Status (If Applicable):  No results found for: HIV, HEPCAB    COVID-19 Screening (If Applicable): No results found for: COVID19      Anesthesia Evaluation Patient summary reviewed no history of anesthetic complications:   Airway: Mallampati: II  TM distance: >3 FB   Neck ROM: full  Mouth opening: > = 3 FB Dental:    (+) upper dentures and lower dentures      Pulmonary:Negative Pulmonary ROS breath sounds clear to auscultation                            ROS comment: COVID Neg 2/26/21   Cardiovascular:Negative CV ROS          ECG reviewed  Rhythm: regular  Rate: normal           Beta Blocker:  Not on Beta Blocker      ROS comment: EKG 6/2019: NSR, Hx of inferior infarct, Hx of anterior infarct      Neuro/Psych:   (+) psychiatric history (Schizophrenia):            GI/Hepatic/Renal:   (+) GERD:,          ROS comment: Hx of esophageal perforation. Endo/Other: Negative Endo/Other ROS                    Abdominal:           Vascular:           ROS comment: Hx of blood clots. Anesthesia Plan      MAC     ASA 2       Induction: intravenous. MIPS: Prophylactic antiemetics administered. Anesthetic plan and risks discussed with patient. Plan discussed with CRNA. Preoperative evaluation note updated on 2/26/2021 based on review of patient's medical records on same date. Patient to be evaluated in person by anesthesiologist on day of surgery. Kristal Zaragoza MD   2/26/2021    Pt seen, examined, chart reviewed, plan discussed.   [facility]  3/1/2021  11:12 AM

## 2021-03-01 ENCOUNTER — ANESTHESIA (OUTPATIENT)
Dept: OPERATING ROOM | Age: 76
End: 2021-03-01
Payer: MEDICARE

## 2021-03-01 ENCOUNTER — HOSPITAL ENCOUNTER (OUTPATIENT)
Age: 76
Setting detail: OUTPATIENT SURGERY
Discharge: HOME | End: 2021-03-01
Attending: OPHTHALMOLOGY | Admitting: OPHTHALMOLOGY
Payer: MEDICARE

## 2021-03-01 VITALS
RESPIRATION RATE: 23 BRPM | DIASTOLIC BLOOD PRESSURE: 72 MMHG | SYSTOLIC BLOOD PRESSURE: 127 MMHG | OXYGEN SATURATION: 97 %

## 2021-03-01 VITALS
DIASTOLIC BLOOD PRESSURE: 80 MMHG | BODY MASS INDEX: 28.49 KG/M2 | WEIGHT: 188 LBS | SYSTOLIC BLOOD PRESSURE: 146 MMHG | TEMPERATURE: 97 F | HEIGHT: 68 IN | HEART RATE: 62 BPM | RESPIRATION RATE: 18 BRPM | OXYGEN SATURATION: 95 %

## 2021-03-01 DIAGNOSIS — H59.021 RETAINED LENS MATTER OF RIGHT EYE: Primary | ICD-10-CM

## 2021-03-01 PROCEDURE — 2500000003 HC RX 250 WO HCPCS: Performed by: OPHTHALMOLOGY

## 2021-03-01 PROCEDURE — 2709999900 HC NON-CHARGEABLE SUPPLY: Performed by: OPHTHALMOLOGY

## 2021-03-01 PROCEDURE — 3600000003 HC SURGERY LEVEL 3 BASE: Performed by: OPHTHALMOLOGY

## 2021-03-01 PROCEDURE — 2580000003 HC RX 258: Performed by: ANESTHESIOLOGY

## 2021-03-01 PROCEDURE — 3600000013 HC SURGERY LEVEL 3 ADDTL 15MIN: Performed by: OPHTHALMOLOGY

## 2021-03-01 PROCEDURE — 3700000000 HC ANESTHESIA ATTENDED CARE: Performed by: OPHTHALMOLOGY

## 2021-03-01 PROCEDURE — 7100000011 HC PHASE II RECOVERY - ADDTL 15 MIN: Performed by: OPHTHALMOLOGY

## 2021-03-01 PROCEDURE — 6360000002 HC RX W HCPCS: Performed by: NURSE ANESTHETIST, CERTIFIED REGISTERED

## 2021-03-01 PROCEDURE — 7100000010 HC PHASE II RECOVERY - FIRST 15 MIN: Performed by: OPHTHALMOLOGY

## 2021-03-01 PROCEDURE — 3700000001 HC ADD 15 MINUTES (ANESTHESIA): Performed by: OPHTHALMOLOGY

## 2021-03-01 PROCEDURE — 6370000000 HC RX 637 (ALT 250 FOR IP): Performed by: OPHTHALMOLOGY

## 2021-03-01 RX ORDER — BALANCED SALT SOLUTION 6.4; .75; .48; .3; 3.9; 1.7 MG/ML; MG/ML; MG/ML; MG/ML; MG/ML; MG/ML
SOLUTION OPHTHALMIC PRN
Status: DISCONTINUED | OUTPATIENT
Start: 2021-03-01 | End: 2021-03-01 | Stop reason: ALTCHOICE

## 2021-03-01 RX ORDER — TROPICAMIDE 10 MG/ML
1 SOLUTION/ DROPS OPHTHALMIC SEE ADMIN INSTRUCTIONS
Status: DISCONTINUED | OUTPATIENT
Start: 2021-03-01 | End: 2021-03-01 | Stop reason: HOSPADM

## 2021-03-01 RX ORDER — PROPARACAINE HYDROCHLORIDE 5 MG/ML
1 SOLUTION/ DROPS OPHTHALMIC SEE ADMIN INSTRUCTIONS
Status: DISCONTINUED | OUTPATIENT
Start: 2021-03-01 | End: 2021-03-01 | Stop reason: HOSPADM

## 2021-03-01 RX ORDER — SODIUM CHLORIDE 0.9 % (FLUSH) 0.9 %
10 SYRINGE (ML) INJECTION EVERY 12 HOURS SCHEDULED
Status: DISCONTINUED | OUTPATIENT
Start: 2021-03-01 | End: 2021-03-01 | Stop reason: HOSPADM

## 2021-03-01 RX ORDER — SODIUM CHLORIDE, SODIUM LACTATE, POTASSIUM CHLORIDE, CALCIUM CHLORIDE 600; 310; 30; 20 MG/100ML; MG/100ML; MG/100ML; MG/100ML
INJECTION, SOLUTION INTRAVENOUS CONTINUOUS
Status: DISCONTINUED | OUTPATIENT
Start: 2021-03-01 | End: 2021-03-01 | Stop reason: HOSPADM

## 2021-03-01 RX ORDER — TETRACAINE HYDROCHLORIDE 5 MG/ML
SOLUTION OPHTHALMIC PRN
Status: DISCONTINUED | OUTPATIENT
Start: 2021-03-01 | End: 2021-03-01 | Stop reason: ALTCHOICE

## 2021-03-01 RX ORDER — NEOMYCIN SULFATE, POLYMYXIN B SULFATE, AND DEXAMETHASONE 3.5; 10000; 1 MG/G; [USP'U]/G; MG/G
OINTMENT OPHTHALMIC PRN
Status: DISCONTINUED | OUTPATIENT
Start: 2021-03-01 | End: 2021-03-01 | Stop reason: ALTCHOICE

## 2021-03-01 RX ORDER — TOBRAMYCIN 3 MG/ML
1 SOLUTION/ DROPS OPHTHALMIC SEE ADMIN INSTRUCTIONS
Status: DISCONTINUED | OUTPATIENT
Start: 2021-03-01 | End: 2021-03-01 | Stop reason: HOSPADM

## 2021-03-01 RX ORDER — FENTANYL CITRATE 50 UG/ML
INJECTION, SOLUTION INTRAMUSCULAR; INTRAVENOUS PRN
Status: DISCONTINUED | OUTPATIENT
Start: 2021-03-01 | End: 2021-03-01 | Stop reason: SDUPTHER

## 2021-03-01 RX ORDER — MIDAZOLAM HYDROCHLORIDE 1 MG/ML
INJECTION INTRAMUSCULAR; INTRAVENOUS PRN
Status: DISCONTINUED | OUTPATIENT
Start: 2021-03-01 | End: 2021-03-01 | Stop reason: SDUPTHER

## 2021-03-01 RX ORDER — SODIUM CHLORIDE 0.9 % (FLUSH) 0.9 %
10 SYRINGE (ML) INJECTION PRN
Status: DISCONTINUED | OUTPATIENT
Start: 2021-03-01 | End: 2021-03-01 | Stop reason: HOSPADM

## 2021-03-01 RX ORDER — PHENYLEPHRINE HCL 2.5 %
1 DROPS OPHTHALMIC (EYE) SEE ADMIN INSTRUCTIONS
Status: DISCONTINUED | OUTPATIENT
Start: 2021-03-01 | End: 2021-03-01 | Stop reason: HOSPADM

## 2021-03-01 RX ADMIN — MIDAZOLAM 2 MG: 1 INJECTION INTRAMUSCULAR; INTRAVENOUS at 11:25

## 2021-03-01 RX ADMIN — SODIUM CHLORIDE, POTASSIUM CHLORIDE, SODIUM LACTATE AND CALCIUM CHLORIDE: 600; 310; 30; 20 INJECTION, SOLUTION INTRAVENOUS at 10:54

## 2021-03-01 RX ADMIN — SODIUM CHLORIDE, POTASSIUM CHLORIDE, SODIUM LACTATE AND CALCIUM CHLORIDE: 600; 310; 30; 20 INJECTION, SOLUTION INTRAVENOUS at 11:25

## 2021-03-01 RX ADMIN — FENTANYL CITRATE 50 MCG: 50 INJECTION, SOLUTION INTRAMUSCULAR; INTRAVENOUS at 11:27

## 2021-03-01 RX ADMIN — FENTANYL CITRATE 25 MCG: 50 INJECTION, SOLUTION INTRAMUSCULAR; INTRAVENOUS at 11:46

## 2021-03-01 RX ADMIN — FENTANYL CITRATE 25 MCG: 50 INJECTION, SOLUTION INTRAMUSCULAR; INTRAVENOUS at 11:33

## 2021-03-01 ASSESSMENT — PAIN SCALES - GENERAL: PAINLEVEL_OUTOF10: 0

## 2021-03-01 ASSESSMENT — PAIN - FUNCTIONAL ASSESSMENT: PAIN_FUNCTIONAL_ASSESSMENT: 0-10

## 2021-03-01 NOTE — H&P
I have examined the patient and reviewed the history and physical from 2/24/21 and find no relevant changes. I have reviewed with the patient and/or family the risks, benefits, and alternatives to the procedure and they have agreed to proceed.     KENYETTA DUMONT

## 2021-03-01 NOTE — ANESTHESIA POSTPROCEDURE EVALUATION
Department of Anesthesiology  Postprocedure Note    Patient: Aixa Vallecillo  MRN: [id number]  YOB: 1945  Date of evaluation: 3/1/2021  Time:  12:40 PM     Procedure Summary     Date: 03/01/21 Room / Location: [address] [place] [address] 02 / [address] [facility]    Anesthesia Start: 0640 Anesthesia Stop: 1157    Procedure: PARS PLANA VITRECTOMY, REMOVAL OF RETAINED FRAGMENTS, 25G, MAC, RIGHT EYE (Right ) Diagnosis: (RETAINED LENS FRAGMENTS RIGHT EYE)    Surgeons: Evi Du MD Responsible Provider: Max Olivo MD    Anesthesia Type: MAC ASA Status: 2          Anesthesia Type: MAC    Kj Phase I: Kj Score: 10    Kj Phase II: Kj Score: 10    Last vitals: Reviewed and per EMR flowsheets.        Anesthesia Post Evaluation    Patient location during evaluation: PACU  Patient participation: complete - patient participated  Level of consciousness: awake  Airway patency: patent  Nausea & Vomiting: no nausea and no vomiting  Complications: no  Cardiovascular status: hemodynamically stable  Respiratory status: acceptable  Hydration status: stable

## 2021-03-01 NOTE — PROGRESS NOTES
PT TOOK PO SNACK IN, DENIES DISTRESS. WIFE AND PT STATE THEY ARE COMFORTABLE BEING DISCHARGED TO HOME.

## 2021-04-30 NOTE — PROGRESS NOTES
Value Date    HDL 50 06/06/2019     No components found for: [facility]  Lab Results   Component Value Date    LABVLDL 25 06/06/2019       Faye Thompson RN no

## (undated) DEVICE — 1060 S-DRAPE SPCL INCISE 10/BX 4BX/CS: Brand: STERI-DRAPE™

## (undated) DEVICE — GLOVE SURG SZ 75 L12IN FNGR THK94MIL STD WHT LTX FREE

## (undated) DEVICE — PACK PROCEDURE SURG RETINAL ST ES CUST

## (undated) DEVICE — SHIELD EYE W3XL2.5IN UNIV CLR PLAS LTWT

## (undated) DEVICE — SYRINGE, LUER LOCK, 10ML: Brand: MEDLINE

## (undated) DEVICE — STOPCOCK IV PRIMING 0.26ML 3 W W/ TWO FEM LUERLOK PRT 1

## (undated) DEVICE — I-KNIFE CUTTING INSTRUMENT MACKOOL SIDEPORT KNIFE: Brand: I-KNIFE

## (undated) DEVICE — NEEDLE RETROBLB 25GA L38MM STR SHFT DISP ATKNSN

## (undated) DEVICE — CANNULA 25GA SOFT TIP 1.0MM SIL TIP

## (undated) DEVICE — VITRECTOMY PACK 25 GA INPUT

## (undated) DEVICE — NEEDLE SYR 18GA L1.5IN RED PLAS HUB S STL BLNT FILL W/O